# Patient Record
Sex: MALE | Race: WHITE | NOT HISPANIC OR LATINO | Employment: FULL TIME | URBAN - METROPOLITAN AREA
[De-identification: names, ages, dates, MRNs, and addresses within clinical notes are randomized per-mention and may not be internally consistent; named-entity substitution may affect disease eponyms.]

---

## 2024-01-10 ENCOUNTER — TRANSFERRED RECORDS (OUTPATIENT)
Dept: HEALTH INFORMATION MANAGEMENT | Facility: CLINIC | Age: 44
End: 2024-01-10

## 2024-01-30 ENCOUNTER — APPOINTMENT (OUTPATIENT)
Dept: GENERAL RADIOLOGY | Facility: CLINIC | Age: 44
End: 2024-01-30
Attending: EMERGENCY MEDICINE
Payer: COMMERCIAL

## 2024-01-30 ENCOUNTER — HOSPITAL ENCOUNTER (INPATIENT)
Facility: CLINIC | Age: 44
LOS: 2 days | Discharge: HOME | End: 2024-02-01
Attending: EMERGENCY MEDICINE | Admitting: STUDENT IN AN ORGANIZED HEALTH CARE EDUCATION/TRAINING PROGRAM
Payer: COMMERCIAL

## 2024-01-30 DIAGNOSIS — R79.89 PSEUDOHYPONATREMIA: ICD-10-CM

## 2024-01-30 DIAGNOSIS — E11.00 HYPEROSMOLAR HYPERGLYCEMIC STATE (HHS) (H): ICD-10-CM

## 2024-01-30 DIAGNOSIS — B37.0 THRUSH: ICD-10-CM

## 2024-01-30 DIAGNOSIS — R73.9 HYPERGLYCEMIA: Primary | ICD-10-CM

## 2024-01-30 LAB
ALBUMIN SERPL BCG-MCNC: 3.8 G/DL (ref 3.5–5.2)
ALBUMIN UR-MCNC: NEGATIVE MG/DL
ALP SERPL-CCNC: 119 U/L (ref 40–150)
ALT SERPL W P-5'-P-CCNC: 13 U/L (ref 0–70)
ANION GAP SERPL CALCULATED.3IONS-SCNC: 16 MMOL/L (ref 7–15)
APPEARANCE UR: CLEAR
AST SERPL W P-5'-P-CCNC: 10 U/L (ref 0–45)
ATRIAL RATE - MUSE: 95 BPM
B-OH-BUTYR SERPL-SCNC: 0.3 MMOL/L
B-OH-BUTYR SERPL-SCNC: 0.7 MMOL/L
B-OH-BUTYR SERPL-SCNC: 0.9 MMOL/L
BASOPHILS # BLD AUTO: 0.1 10E3/UL (ref 0–0.2)
BASOPHILS NFR BLD AUTO: 0 %
BILIRUB DIRECT SERPL-MCNC: <0.2 MG/DL (ref 0–0.3)
BILIRUB SERPL-MCNC: 0.5 MG/DL
BILIRUB UR QL STRIP: NEGATIVE
BUN SERPL-MCNC: 23 MG/DL (ref 6–20)
CALCIUM SERPL-MCNC: 9.3 MG/DL (ref 8.6–10)
CHLORIDE SERPL-SCNC: 76 MMOL/L (ref 98–107)
COLOR UR AUTO: ABNORMAL
CREAT SERPL-MCNC: 0.73 MG/DL (ref 0.67–1.17)
CREAT SERPL-MCNC: 0.93 MG/DL (ref 0.67–1.17)
D DIMER PPP FEU-MCNC: 0.44 UG/ML FEU (ref 0–0.5)
DEPRECATED HCO3 PLAS-SCNC: 24 MMOL/L (ref 22–29)
DIASTOLIC BLOOD PRESSURE - MUSE: NORMAL MMHG
EGFRCR SERPLBLD CKD-EPI 2021: >90 ML/MIN/1.73M2
EGFRCR SERPLBLD CKD-EPI 2021: >90 ML/MIN/1.73M2
EOSINOPHIL # BLD AUTO: 0.1 10E3/UL (ref 0–0.7)
EOSINOPHIL NFR BLD AUTO: 1 %
ERYTHROCYTE [DISTWIDTH] IN BLOOD BY AUTOMATED COUNT: 11.9 % (ref 10–15)
FLUAV RNA SPEC QL NAA+PROBE: NEGATIVE
FLUBV RNA RESP QL NAA+PROBE: NEGATIVE
GLUCOSE BLDC GLUCOMTR-MCNC: 273 MG/DL (ref 70–99)
GLUCOSE BLDC GLUCOMTR-MCNC: 318 MG/DL (ref 70–99)
GLUCOSE BLDC GLUCOMTR-MCNC: 382 MG/DL (ref 70–99)
GLUCOSE BLDC GLUCOMTR-MCNC: 425 MG/DL (ref 70–99)
GLUCOSE BLDC GLUCOMTR-MCNC: 464 MG/DL (ref 70–99)
GLUCOSE SERPL-MCNC: 618 MG/DL (ref 70–99)
GLUCOSE SERPL-MCNC: 949 MG/DL (ref 70–99)
GLUCOSE UR STRIP-MCNC: >=1000 MG/DL
GROUP A STREP BY PCR: NOT DETECTED
HBA1C MFR BLD: 16.5 %
HCO3 BLDV-SCNC: 29 MMOL/L (ref 21–28)
HCT VFR BLD AUTO: 40.2 % (ref 40–53)
HGB BLD-MCNC: 14 G/DL (ref 13.3–17.7)
HGB UR QL STRIP: NEGATIVE
IMM GRANULOCYTES # BLD: 0.1 10E3/UL
IMM GRANULOCYTES NFR BLD: 1 %
INTERPRETATION ECG - MUSE: NORMAL
KETONES UR STRIP-MCNC: ABNORMAL MG/DL
LACTATE BLD-SCNC: 1.7 MMOL/L
LEUKOCYTE ESTERASE UR QL STRIP: NEGATIVE
LYMPHOCYTES # BLD AUTO: 1.7 10E3/UL (ref 0.8–5.3)
LYMPHOCYTES NFR BLD AUTO: 13 %
MAGNESIUM SERPL-MCNC: 1.8 MG/DL (ref 1.7–2.3)
MCH RBC QN AUTO: 31 PG (ref 26.5–33)
MCHC RBC AUTO-ENTMCNC: 34.8 G/DL (ref 31.5–36.5)
MCV RBC AUTO: 89 FL (ref 78–100)
MONOCYTES # BLD AUTO: 0.8 10E3/UL (ref 0–1.3)
MONOCYTES NFR BLD AUTO: 6 %
NEUTROPHILS # BLD AUTO: 10.3 10E3/UL (ref 1.6–8.3)
NEUTROPHILS NFR BLD AUTO: 79 %
NITRATE UR QL: NEGATIVE
NRBC # BLD AUTO: 0 10E3/UL
NRBC BLD AUTO-RTO: 0 /100
OSMOLALITY SERPL: 298 MMOL/KG (ref 275–295)
P AXIS - MUSE: 22 DEGREES
PCO2 BLDV: 38 MM HG (ref 40–50)
PH BLDV: 7.48 [PH] (ref 7.32–7.43)
PH UR STRIP: 6.5 [PH] (ref 5–7)
PHOSPHATE SERPL-MCNC: 3.3 MG/DL (ref 2.5–4.5)
PLATELET # BLD AUTO: 241 10E3/UL (ref 150–450)
PO2 BLDV: 23 MM HG (ref 25–47)
POTASSIUM SERPL-SCNC: 4 MMOL/L (ref 3.4–5.3)
PR INTERVAL - MUSE: 156 MS
PROCALCITONIN SERPL IA-MCNC: 0.17 NG/ML
PROT SERPL-MCNC: 6.9 G/DL (ref 6.4–8.3)
QRS DURATION - MUSE: 142 MS
QT - MUSE: 394 MS
QTC - MUSE: 495 MS
R AXIS - MUSE: -28 DEGREES
RBC # BLD AUTO: 4.52 10E6/UL (ref 4.4–5.9)
RBC URINE: 0 /HPF
RSV RNA SPEC NAA+PROBE: NEGATIVE
SAO2 % BLDV: 45 % (ref 70–75)
SARS-COV-2 RNA RESP QL NAA+PROBE: POSITIVE
SODIUM SERPL-SCNC: 116 MMOL/L (ref 135–145)
SP GR UR STRIP: 1.02 (ref 1–1.03)
SYSTOLIC BLOOD PRESSURE - MUSE: NORMAL MMHG
T AXIS - MUSE: 4 DEGREES
TROPONIN T SERPL HS-MCNC: 13 NG/L
UROBILINOGEN UR STRIP-MCNC: NORMAL MG/DL
VENTRICULAR RATE- MUSE: 95 BPM
WBC # BLD AUTO: 13 10E3/UL (ref 4–11)
WBC URINE: 0 /HPF

## 2024-01-30 PROCEDURE — 82962 GLUCOSE BLOOD TEST: CPT

## 2024-01-30 PROCEDURE — 94640 AIRWAY INHALATION TREATMENT: CPT

## 2024-01-30 PROCEDURE — 93005 ELECTROCARDIOGRAM TRACING: CPT

## 2024-01-30 PROCEDURE — 258N000003 HC RX IP 258 OP 636: Performed by: EMERGENCY MEDICINE

## 2024-01-30 PROCEDURE — 82248 BILIRUBIN DIRECT: CPT | Performed by: STUDENT IN AN ORGANIZED HEALTH CARE EDUCATION/TRAINING PROGRAM

## 2024-01-30 PROCEDURE — 85041 AUTOMATED RBC COUNT: CPT | Performed by: EMERGENCY MEDICINE

## 2024-01-30 PROCEDURE — 84484 ASSAY OF TROPONIN QUANT: CPT | Performed by: EMERGENCY MEDICINE

## 2024-01-30 PROCEDURE — 250N000013 HC RX MED GY IP 250 OP 250 PS 637: Performed by: EMERGENCY MEDICINE

## 2024-01-30 PROCEDURE — 82010 KETONE BODYS QUAN: CPT | Performed by: STUDENT IN AN ORGANIZED HEALTH CARE EDUCATION/TRAINING PROGRAM

## 2024-01-30 PROCEDURE — 82010 KETONE BODYS QUAN: CPT | Performed by: EMERGENCY MEDICINE

## 2024-01-30 PROCEDURE — 83735 ASSAY OF MAGNESIUM: CPT | Performed by: STUDENT IN AN ORGANIZED HEALTH CARE EDUCATION/TRAINING PROGRAM

## 2024-01-30 PROCEDURE — 87651 STREP A DNA AMP PROBE: CPT | Performed by: EMERGENCY MEDICINE

## 2024-01-30 PROCEDURE — 85379 FIBRIN DEGRADATION QUANT: CPT | Performed by: STUDENT IN AN ORGANIZED HEALTH CARE EDUCATION/TRAINING PROGRAM

## 2024-01-30 PROCEDURE — 96360 HYDRATION IV INFUSION INIT: CPT

## 2024-01-30 PROCEDURE — 36415 COLL VENOUS BLD VENIPUNCTURE: CPT | Performed by: EMERGENCY MEDICINE

## 2024-01-30 PROCEDURE — 83930 ASSAY OF BLOOD OSMOLALITY: CPT | Performed by: EMERGENCY MEDICINE

## 2024-01-30 PROCEDURE — 36415 COLL VENOUS BLD VENIPUNCTURE: CPT | Performed by: STUDENT IN AN ORGANIZED HEALTH CARE EDUCATION/TRAINING PROGRAM

## 2024-01-30 PROCEDURE — 71046 X-RAY EXAM CHEST 2 VIEWS: CPT

## 2024-01-30 PROCEDURE — 258N000003 HC RX IP 258 OP 636: Performed by: STUDENT IN AN ORGANIZED HEALTH CARE EDUCATION/TRAINING PROGRAM

## 2024-01-30 PROCEDURE — 82803 BLOOD GASES ANY COMBINATION: CPT

## 2024-01-30 PROCEDURE — 84145 PROCALCITONIN (PCT): CPT | Performed by: STUDENT IN AN ORGANIZED HEALTH CARE EDUCATION/TRAINING PROGRAM

## 2024-01-30 PROCEDURE — 83036 HEMOGLOBIN GLYCOSYLATED A1C: CPT | Performed by: EMERGENCY MEDICINE

## 2024-01-30 PROCEDURE — 250N000009 HC RX 250: Performed by: EMERGENCY MEDICINE

## 2024-01-30 PROCEDURE — 99285 EMERGENCY DEPT VISIT HI MDM: CPT | Mod: 25

## 2024-01-30 PROCEDURE — 120N000013 HC R&B IMCU

## 2024-01-30 PROCEDURE — 250N000012 HC RX MED GY IP 250 OP 636 PS 637: Performed by: EMERGENCY MEDICINE

## 2024-01-30 PROCEDURE — 87637 SARSCOV2&INF A&B&RSV AMP PRB: CPT | Performed by: EMERGENCY MEDICINE

## 2024-01-30 PROCEDURE — 82947 ASSAY GLUCOSE BLOOD QUANT: CPT | Performed by: EMERGENCY MEDICINE

## 2024-01-30 PROCEDURE — 99223 1ST HOSP IP/OBS HIGH 75: CPT | Performed by: STUDENT IN AN ORGANIZED HEALTH CARE EDUCATION/TRAINING PROGRAM

## 2024-01-30 PROCEDURE — 82374 ASSAY BLOOD CARBON DIOXIDE: CPT | Performed by: EMERGENCY MEDICINE

## 2024-01-30 PROCEDURE — 81001 URINALYSIS AUTO W/SCOPE: CPT | Performed by: EMERGENCY MEDICINE

## 2024-01-30 PROCEDURE — 96361 HYDRATE IV INFUSION ADD-ON: CPT

## 2024-01-30 PROCEDURE — 84100 ASSAY OF PHOSPHORUS: CPT | Performed by: STUDENT IN AN ORGANIZED HEALTH CARE EDUCATION/TRAINING PROGRAM

## 2024-01-30 PROCEDURE — 250N000013 HC RX MED GY IP 250 OP 250 PS 637: Performed by: STUDENT IN AN ORGANIZED HEALTH CARE EDUCATION/TRAINING PROGRAM

## 2024-01-30 PROCEDURE — 82565 ASSAY OF CREATININE: CPT | Performed by: STUDENT IN AN ORGANIZED HEALTH CARE EDUCATION/TRAINING PROGRAM

## 2024-01-30 RX ORDER — ENOXAPARIN SODIUM 100 MG/ML
40 INJECTION SUBCUTANEOUS EVERY 24 HOURS
Status: DISCONTINUED | OUTPATIENT
Start: 2024-01-31 | End: 2024-02-01 | Stop reason: HOSPADM

## 2024-01-30 RX ORDER — PREDNISONE 20 MG/1
20 TABLET ORAL DAILY
COMMUNITY
End: 2024-01-30

## 2024-01-30 RX ORDER — ALBUTEROL SULFATE 90 UG/1
2 AEROSOL, METERED RESPIRATORY (INHALATION) EVERY 4 HOURS PRN
COMMUNITY
Start: 2023-05-10 | End: 2024-02-28

## 2024-01-30 RX ORDER — FEXOFENADINE HCL AND PSEUDOEPHEDRINE HCI 60; 120 MG/1; MG/1
1 TABLET, EXTENDED RELEASE ORAL 2 TIMES DAILY
COMMUNITY
End: 2024-02-07

## 2024-01-30 RX ORDER — DEXTROSE MONOHYDRATE 25 G/50ML
25-50 INJECTION, SOLUTION INTRAVENOUS
Status: DISCONTINUED | OUTPATIENT
Start: 2024-01-30 | End: 2024-01-30

## 2024-01-30 RX ORDER — NICOTINE 21 MG/24HR
1 PATCH, TRANSDERMAL 24 HOURS TRANSDERMAL DAILY
Status: DISCONTINUED | OUTPATIENT
Start: 2024-01-31 | End: 2024-01-30

## 2024-01-30 RX ORDER — NICOTINE POLACRILEX 4 MG
15-30 LOZENGE BUCCAL
Status: DISCONTINUED | OUTPATIENT
Start: 2024-01-30 | End: 2024-01-30

## 2024-01-30 RX ORDER — DEXTROSE MONOHYDRATE 25 G/50ML
25-50 INJECTION, SOLUTION INTRAVENOUS
Status: DISCONTINUED | OUTPATIENT
Start: 2024-01-30 | End: 2024-01-31

## 2024-01-30 RX ORDER — OMEPRAZOLE AND SODIUM BICARBONATE 40; 1100 MG/1; MG/1
1 CAPSULE ORAL
COMMUNITY
End: 2024-02-07

## 2024-01-30 RX ORDER — SODIUM CHLORIDE AND POTASSIUM CHLORIDE 150; 900 MG/100ML; MG/100ML
INJECTION, SOLUTION INTRAVENOUS CONTINUOUS
Status: DISCONTINUED | OUTPATIENT
Start: 2024-01-30 | End: 2024-01-31

## 2024-01-30 RX ORDER — GUAIFENESIN 600 MG/1
600 TABLET, EXTENDED RELEASE ORAL 2 TIMES DAILY
COMMUNITY

## 2024-01-30 RX ORDER — NICOTINE POLACRILEX 4 MG
15-30 LOZENGE BUCCAL
Status: DISCONTINUED | OUTPATIENT
Start: 2024-01-30 | End: 2024-01-31

## 2024-01-30 RX ORDER — DEXTROSE MONOHYDRATE, SODIUM CHLORIDE, AND POTASSIUM CHLORIDE 50; 1.49; 4.5 G/1000ML; G/1000ML; G/1000ML
INJECTION, SOLUTION INTRAVENOUS CONTINUOUS
Status: DISCONTINUED | OUTPATIENT
Start: 2024-01-30 | End: 2024-01-31

## 2024-01-30 RX ORDER — IPRATROPIUM BROMIDE AND ALBUTEROL SULFATE 2.5; .5 MG/3ML; MG/3ML
6 SOLUTION RESPIRATORY (INHALATION) ONCE
Status: COMPLETED | OUTPATIENT
Start: 2024-01-30 | End: 2024-01-30

## 2024-01-30 RX ORDER — NICOTINE 21 MG/24HR
1 PATCH, TRANSDERMAL 24 HOURS TRANSDERMAL DAILY
Status: DISCONTINUED | OUTPATIENT
Start: 2024-01-30 | End: 2024-02-01 | Stop reason: HOSPADM

## 2024-01-30 RX ORDER — NITROGLYCERIN 0.4 MG/1
0.4 TABLET SUBLINGUAL EVERY 5 MIN PRN
Status: DISCONTINUED | OUTPATIENT
Start: 2024-01-30 | End: 2024-02-01 | Stop reason: HOSPADM

## 2024-01-30 RX ORDER — SODIUM CHLORIDE 9 MG/ML
INJECTION, SOLUTION INTRAVENOUS CONTINUOUS
Status: DISCONTINUED | OUTPATIENT
Start: 2024-01-30 | End: 2024-01-30

## 2024-01-30 RX ORDER — LISINOPRIL 40 MG/1
40 TABLET ORAL DAILY
COMMUNITY
Start: 2023-05-10 | End: 2024-02-07

## 2024-01-30 RX ORDER — CHLORTHALIDONE 25 MG/1
25 TABLET ORAL DAILY
COMMUNITY
End: 2024-02-07

## 2024-01-30 RX ORDER — ENOXAPARIN SODIUM 100 MG/ML
40 INJECTION SUBCUTANEOUS EVERY 24 HOURS
Status: DISCONTINUED | OUTPATIENT
Start: 2024-01-30 | End: 2024-01-30

## 2024-01-30 RX ORDER — FLUTICASONE PROPIONATE AND SALMETEROL 250; 50 UG/1; UG/1
1 POWDER RESPIRATORY (INHALATION) 2 TIMES DAILY
COMMUNITY
Start: 2023-05-10 | End: 2024-02-07

## 2024-01-30 RX ORDER — NAPROXEN SODIUM 220 MG
440 TABLET ORAL DAILY
COMMUNITY
End: 2024-02-28 | Stop reason: ALTCHOICE

## 2024-01-30 RX ORDER — LIDOCAINE 40 MG/G
CREAM TOPICAL
Status: DISCONTINUED | OUTPATIENT
Start: 2024-01-30 | End: 2024-02-01 | Stop reason: HOSPADM

## 2024-01-30 RX ADMIN — INSULIN ASPART 10 UNITS: 100 INJECTION, SOLUTION INTRAVENOUS; SUBCUTANEOUS at 16:14

## 2024-01-30 RX ADMIN — NICOTINE 1 PATCH: 14 PATCH, EXTENDED RELEASE TRANSDERMAL at 23:24

## 2024-01-30 RX ADMIN — INSULIN HUMAN 5.5 UNITS/HR: 1 INJECTION, SOLUTION INTRAVENOUS at 18:04

## 2024-01-30 RX ADMIN — POTASSIUM CHLORIDE, DEXTROSE MONOHYDRATE AND SODIUM CHLORIDE: 150; 5; 450 INJECTION, SOLUTION INTRAVENOUS at 23:24

## 2024-01-30 RX ADMIN — IPRATROPIUM BROMIDE AND ALBUTEROL SULFATE 6 ML: .5; 3 SOLUTION RESPIRATORY (INHALATION) at 16:53

## 2024-01-30 RX ADMIN — SODIUM CHLORIDE 1000 ML: 9 INJECTION, SOLUTION INTRAVENOUS at 20:36

## 2024-01-30 RX ADMIN — SODIUM CHLORIDE 1000 ML: 9 INJECTION, SOLUTION INTRAVENOUS at 15:47

## 2024-01-30 RX ADMIN — NYSTATIN 600000 UNITS: 100000 SUSPENSION ORAL at 17:45

## 2024-01-30 ASSESSMENT — ACTIVITIES OF DAILY LIVING (ADL)
ADLS_ACUITY_SCORE: 35

## 2024-01-30 NOTE — PHARMACY-ADMISSION MEDICATION HISTORY
Pharmacist Admission Medication History    Admission medication history is complete. The information provided in this note is only as accurate as the sources available at the time of the update.    Information Source(s): Patient, Family member, Clinic records, Hospital records, and [facility] via in-person    Pertinent Information: Patient was recently prescribed prednisone burst, therapy completed.     Changes made to PTA medication list:  Added: added all meds to list   Deleted: None  Changed: None    Allergies reviewed with patient and updates made in EHR: yes    Medication History Completed By: Jess Layton RPH 1/30/2024 5:02 PM    PTA Med List   Medication Sig Last Dose    albuterol (PROAIR HFA/PROVENTIL HFA/VENTOLIN HFA) 108 (90 Base) MCG/ACT inhaler Inhale 2 puffs into the lungs every 4 hours as needed for shortness of breath or wheezing  at prn    chlorthalidone (HYGROTON) 25 MG tablet Take 25 mg by mouth daily 1/30/2024    fexofenadine-pseudoePHEDrine (ALLEGRA-D)  MG 12 hr tablet Take 1 tablet by mouth 2 times daily 1/30/2024    fluticasone-salmeterol (ADVAIR) 250-50 MCG/ACT inhaler Inhale 1 puff into the lungs 2 times daily 1/30/2024    guaiFENesin (MUCINEX) 600 MG 12 hr tablet Take 600 mg by mouth 2 times daily 1/30/2024    lisinopril (ZESTRIL) 40 MG tablet Take 40 mg by mouth daily 1/30/2024    naproxen sodium (ANAPROX) 220 MG tablet Take 440 mg by mouth daily 1/30/2024    omeprazole-sodium bicarbonate (ZEGERID)  MG capsule Take 1 capsule by mouth every morning (before breakfast) 1/30/2024

## 2024-01-30 NOTE — ED NOTES
Lab called reporting A1C very high and out of range, unable to get result. Lab ran twice with same (lack of) result.

## 2024-01-30 NOTE — ED TRIAGE NOTES
Patient was dx with COVID 3 weeks ago and has been having a cough with production and SOB every since.          PAST MEDICAL HISTORY:  Irregular menses

## 2024-01-30 NOTE — ED PROVIDER NOTES
"  History     Chief Complaint:  Cough and Shortness of Breath       HPI   Alexey Arriaga is a 43 year old male with a history of asthma, GERD, hypertension, and tobacco use who presents with a cough and shortness of breath.  Patient reports he tested positive for COVID 3 weeks ago, and has been extremely fatigued since.  As of the last 5 days, he also developed increased shortness of breath and a productive cough, which exacerbates his shortness of breath.  He adds that he also feels more short of breath when lying down on his right side or his back.  He does endorse a sore throat.  No nausea, vomiting, diarrhea, or leg swelling.  He has been using inhalers at home, which provide minimal relief.    Independent Historian:   None - Patient Only    Review of External Notes:   See MDM     Medications:    Albuterol  Cephalexin   Cetirizine   Chlorthalidone   Lansoprazole   Loratadine   Prednisone     Past Medical History:    Hypertension  Hypertriglyceridemia  Asthma  GERD  Migraines  Staph skin infection  Perirectal abscess  Bronchitis  Type 2 diabetes    Past Surgical History:    Colectomy  Perianal fistula X2  Dupree teeth  Left leg surgery    Physical Exam   Patient Vitals for the past 24 hrs:   BP Temp Temp src Pulse Resp SpO2 Height Weight   01/30/24 1700 (!) 166/110 -- -- 93 15 99 % -- --   01/30/24 1630 113/82 -- -- 93 -- 92 % -- --   01/30/24 1629 -- -- -- -- 23 -- -- --   01/30/24 1541 111/78 -- -- 96 14 98 % -- --   01/30/24 1329 109/71 98.3  F (36.8  C) Oral 99 22 96 % 1.88 m (6' 2\") 108.9 kg (240 lb)      Physical Exam  Constitutional: Well developed, forlorn, somewhat ill, nontox appearance  Head: Atraumatic.   Mouth/Throat: Oropharynx is clear and moist, thrush noted  Neck:  no stridor  Eyes: no scleral icterus  Cardiovascular: RRR, 2+ bilat radial pulses  Pulmonary/Chest: nml resp effort, Clear BS bilat  Abdominal: ND, soft, NT, no rebound or guarding   Ext: Warm, well perfused, no " edema  Neurological: A&O, symmetric facies, moves ext x4  Skin: Skin is warm and dry.   Psychiatric: Behavior is normal. Thought content normal.   Nursing note and vitals reviewed.    Emergency Department Course   ECG  ECG results from 01/30/24   EKG 12 lead     Value    Systolic Blood Pressure     Diastolic Blood Pressure     Ventricular Rate 95    Atrial Rate 95    HI Interval 156    QRS Duration 142        QTc 495    P Axis 22    R AXIS -28    T Axis 4    Interpretation ECG      Sinus rhythm  Possible Left atrial enlargement  Right bundle branch block  Read by: Dr. Alec De MD       Imaging:  Chest XR,  PA & LAT   Final Result   IMPRESSION: No focal consolidation, pleural effusion or pneumothorax.   Cardiomediastinal silhouette is unremarkable.      ARIN CARSON MD            SYSTEM ID:  [id number]         Report per radiology    Laboratory:  Labs Ordered and Resulted from Time of ED Arrival to Time of ED Departure   BASIC METABOLIC PANEL - Abnormal       Result Value    Sodium 116 (*)     Potassium 4.0      Chloride 76 (*)     Carbon Dioxide (CO2) 24      Anion Gap 16 (*)     Urea Nitrogen 23.0 (*)     Creatinine 0.93      GFR Estimate >90      Calcium 9.3      Glucose 949 (*)    CBC WITH PLATELETS AND DIFFERENTIAL - Abnormal    WBC Count 13.0 (*)     RBC Count 4.52      Hemoglobin 14.0      Hematocrit 40.2      MCV 89      MCH 31.0      MCHC 34.8      RDW 11.9      Platelet Count 241      % Neutrophils 79      % Lymphocytes 13      % Monocytes 6      % Eosinophils 1      % Basophils 0      % Immature Granulocytes 1      NRBCs per 100 WBC 0      Absolute Neutrophils 10.3 (*)     Absolute Lymphocytes 1.7      Absolute Monocytes 0.8      Absolute Eosinophils 0.1      Absolute Basophils 0.1      Absolute Immature Granulocytes 0.1      Absolute NRBCs 0.0     KETONE BETA-HYDROXYBUTYRATE QUANTITATIVE, RAPID - Abnormal    Ketone (Beta-Hydroxybutyrate) Quantitative 0.70 (*)    ROUTINE UA WITH  MICROSCOPIC REFLEX TO CULTURE - Abnormal    Color Urine Straw      Appearance Urine Clear      Glucose Urine >=1000 (*)     Bilirubin Urine Negative      Ketones Urine Trace (*)     Specific Gravity Urine 1.025      Blood Urine Negative      pH Urine 6.5      Protein Albumin Urine Negative      Urobilinogen Urine Normal      Nitrite Urine Negative      Leukocyte Esterase Urine Negative      RBC Urine 0      WBC Urine 0     OSMOLALITY - Abnormal    Osmolality Blood 298 (*)    INFLUENZA A/B, RSV, & SARS-COV2 PCR - Abnormal    Influenza A PCR Negative      Influenza B PCR Negative      RSV PCR Negative      SARS CoV2 PCR Positive (*)    HEMOGLOBIN A1C - Abnormal    Hemoglobin A1C 16.5 (*)    ISTAT GASES LACTATE VENOUS POCT - Abnormal    Lactic Acid POCT 1.7      Bicarbonate Venous POCT 29 (*)     O2 Sat, Venous POCT 45 (*)     pCO2 Venous POCT 38 (*)     pH Venous POCT 7.48 (*)     pO2 Venous POCT 23 (*)    GLUCOSE - Abnormal    Glucose 618 (*)    TROPONIN T, HIGH SENSITIVITY - Normal    Troponin T, High Sensitivity 13     GROUP A STREPTOCOCCUS PCR THROAT SWAB - Normal    Group A strep by PCR Not Detected     GLUCOSE MONITOR NURSING POCT      Emergency Department Course & Assessments:    Interventions:  Medications   sodium chloride (PF) 0.9% PF flush 3 mL ( Intracatheter Canceled Entry 1/30/24 1549)   sodium chloride (PF) 0.9% PF flush 3 mL (has no administration in time range)   glucose gel 15-30 g (has no administration in time range)     Or   dextrose 50 % injection 25-50 mL (has no administration in time range)     Or   glucagon injection 1 mg (has no administration in time range)   insulin regular (MYXREDLIN) 1 unit/mL infusion (5.5 Units/hr Intravenous $New Bag 1/30/24 1804)   sodium chloride 0.9% BOLUS 1,000 mL (0 mLs Intravenous Stopped 1/30/24 1755)   insulin aspart (NovoLOG) injection (RAPID ACTING) (10 Units Subcutaneous $Given 1/30/24 1614)   ipratropium - albuterol 0.5 mg/2.5 mg/3 mL (DUONEB) neb solution  6 mL (6 mLs Nebulization $Given 24 3707)   nystatin (MYCOSTATIN) [id number] unit/mL suspension 600,000 Units (600,000 Units Oral $Given 24 1745)      Independent Interpretation (X-rays, CTs, rhythm strip):  See MDM    Assessments/Consultations/Discussion of Management or Tests:  ED Course as of 24 1811   Tue 2024   1553 I evaluated the patient   1729 C/w Dr. Polo, hospitalist.      Social Determinants of Health affecting care:   See MDM    Disposition:  The patient was admitted to the hospital under the care of Dr. Polo.     Impression & Plan    Medical Decision Makin year old male presenting w/ cough     Social determinants affecting patient's health include: Continued tobacco use increasing risk for complications     I reviewed medical records from [facility] ED visit on 2024     DDx includes viral syndrome NOS, influenza-like illness, influenza, COVID-19.    Labs significant for hyperglycemia with minimal anion gap, no acidosis, COVID-19 positive although likely lingering from recent COVID diagnosis.  Imaging sig for no pneumothoraces on my independent interpretation with radiology read as noted above.  Patient appears to be in HHS without evidence of DKA.  His hyponatremia corrects to 130-136 is consistent with pseudohyponatremia given his hyperglycemia.  Hemoglobin A1c is significantly elevated.  I think would be appropriate to place the patient on insulin infusion to have him admitted for glucose correction as well labs diabetic teaching.  Physical exam is also consistent with thrush with first dose of nystatin given as noted above.  His recent steroid course likely exacerbated baseline and hyperglycemia. Pt counseled on all results, disposition and diagnosis.  They are understanding and agreeable to plan. Patient admitted in stable condition.      Critical care time: 30 minutes excluding procedures    Diagnosis:    ICD-10-CM    1. Pseudohyponatremia  R79.89       2.  Hyperosmolar hyperglycemic state (HHS) (H)  E11.00       3. Hyperglycemia  R73.9       4. Thrush  B37.0            Scribe Disclosure:  I, FER VILLARREAL, am serving as a scribe at 3:19 PM on 1/30/2024 to document services personally performed by Alec De MD based on my observations and the provider's statements to me.     1/30/2024   Alec De MD Vaughn, Christopher E, MD  01/30/24 1814

## 2024-01-30 NOTE — ED NOTES
"PIT/Triage Evaluation    Patient presented with cough and shortness of breath. Dennis reports a mildly productive cough, shortness of breath, and lightheadness for the last three weeks, noting that he was diagnosed with COVID three weeks ago. He also notes pharyngitis. He states that his symptoms have been worsening.    Exam is notable for:    Patient Vitals for the past 24 hrs:   BP Temp Temp src Pulse Resp SpO2 Height Weight   01/30/24 1329 109/71 98.3  F (36.8  C) Oral 99 22 96 % 1.88 m (6' 2\") 108.9 kg (240 lb)     General:  Alert, interactive  Cardiovascular:  Well perfused  Lungs:  No respiratory distress, no accessory muscle use  Neuro:  Moving all 4 extremities  Skin:  Warm, dry  Psych:  Normal affect    Appropriate interventions for symptom management were initiated if applicable.  Appropriate diagnostic tests were initiated if indicated.    Important information for subsequent clinician:  Middle-aged gentleman with a history of reactive airways disease and smoking presenting with ongoing cough, 3 weeks after being diagnosed with COVID.  Also describes some lightheadedness and dizziness.  Considering second visit for this issue, labs are obtained along with an EKG and a chest x-ray.    I briefly evaluated the patient and developed an initial plan of care. I discussed this plan and explained that this brief interaction does not constitute a full evaluation. Patient/family understands that they should wait to be fully evaluated and discuss any test results with another clinician prior to leaving the hospital.    Scribe Disclosure:  I, Hejeny Paras, am serving as a scribe at 1:35 PM on 1/30/2024 to document services personally performed by Trierweiler, Chad A, MD based on my observations and the provider's statements to me.     Trierweiler, Chad A, MD  01/30/24 1341    "

## 2024-01-31 ENCOUNTER — APPOINTMENT (OUTPATIENT)
Dept: CARDIOLOGY | Facility: CLINIC | Age: 44
End: 2024-01-31
Attending: STUDENT IN AN ORGANIZED HEALTH CARE EDUCATION/TRAINING PROGRAM
Payer: COMMERCIAL

## 2024-01-31 LAB
ANION GAP SERPL CALCULATED.3IONS-SCNC: 10 MMOL/L (ref 7–15)
ANION GAP SERPL CALCULATED.3IONS-SCNC: 12 MMOL/L (ref 7–15)
B-OH-BUTYR SERPL-SCNC: 0.4 MMOL/L
B-OH-BUTYR SERPL-SCNC: 0.9 MMOL/L
B-OH-BUTYR SERPL-SCNC: <0.18 MMOL/L
B-OH-BUTYR SERPL-SCNC: <0.18 MMOL/L
BUN SERPL-MCNC: 12.2 MG/DL (ref 6–20)
BUN SERPL-MCNC: 13.4 MG/DL (ref 6–20)
BUN SERPL-MCNC: 13.7 MG/DL (ref 6–20)
BUN SERPL-MCNC: 17.1 MG/DL (ref 6–20)
CALCIUM SERPL-MCNC: 9.5 MG/DL (ref 8.6–10)
CALCIUM SERPL-MCNC: 9.5 MG/DL (ref 8.6–10)
CALCIUM SERPL-MCNC: 9.7 MG/DL (ref 8.6–10)
CALCIUM SERPL-MCNC: 9.8 MG/DL (ref 8.6–10)
CHLORIDE SERPL-SCNC: 91 MMOL/L (ref 98–107)
CHLORIDE SERPL-SCNC: 93 MMOL/L (ref 98–107)
CHLORIDE SERPL-SCNC: 94 MMOL/L (ref 98–107)
CHLORIDE SERPL-SCNC: 94 MMOL/L (ref 98–107)
CREAT SERPL-MCNC: 0.69 MG/DL (ref 0.67–1.17)
CREAT SERPL-MCNC: 0.73 MG/DL (ref 0.67–1.17)
CREAT SERPL-MCNC: 0.79 MG/DL (ref 0.67–1.17)
CREAT SERPL-MCNC: 0.79 MG/DL (ref 0.67–1.17)
DEPRECATED HCO3 PLAS-SCNC: 25 MMOL/L (ref 22–29)
DEPRECATED HCO3 PLAS-SCNC: 26 MMOL/L (ref 22–29)
DEPRECATED HCO3 PLAS-SCNC: 28 MMOL/L (ref 22–29)
DEPRECATED HCO3 PLAS-SCNC: 32 MMOL/L (ref 22–29)
EGFRCR SERPLBLD CKD-EPI 2021: >90 ML/MIN/1.73M2
GLUCOSE BLDC GLUCOMTR-MCNC: 147 MG/DL (ref 70–99)
GLUCOSE BLDC GLUCOMTR-MCNC: 166 MG/DL (ref 70–99)
GLUCOSE BLDC GLUCOMTR-MCNC: 178 MG/DL (ref 70–99)
GLUCOSE BLDC GLUCOMTR-MCNC: 191 MG/DL (ref 70–99)
GLUCOSE BLDC GLUCOMTR-MCNC: 230 MG/DL (ref 70–99)
GLUCOSE BLDC GLUCOMTR-MCNC: 233 MG/DL (ref 70–99)
GLUCOSE BLDC GLUCOMTR-MCNC: 254 MG/DL (ref 70–99)
GLUCOSE BLDC GLUCOMTR-MCNC: 289 MG/DL (ref 70–99)
GLUCOSE BLDC GLUCOMTR-MCNC: 296 MG/DL (ref 70–99)
GLUCOSE BLDC GLUCOMTR-MCNC: 307 MG/DL (ref 70–99)
GLUCOSE BLDC GLUCOMTR-MCNC: 327 MG/DL (ref 70–99)
GLUCOSE BLDC GLUCOMTR-MCNC: 330 MG/DL (ref 70–99)
GLUCOSE BLDC GLUCOMTR-MCNC: 354 MG/DL (ref 70–99)
GLUCOSE BLDC GLUCOMTR-MCNC: 364 MG/DL (ref 70–99)
GLUCOSE BLDC GLUCOMTR-MCNC: 365 MG/DL (ref 70–99)
GLUCOSE SERPL-MCNC: 158 MG/DL (ref 70–99)
GLUCOSE SERPL-MCNC: 160 MG/DL (ref 70–99)
GLUCOSE SERPL-MCNC: 183 MG/DL (ref 70–99)
GLUCOSE SERPL-MCNC: 339 MG/DL (ref 70–99)
LVEF ECHO: NORMAL
MAGNESIUM SERPL-MCNC: 1.8 MG/DL (ref 1.7–2.3)
NT-PROBNP SERPL-MCNC: <36 PG/ML (ref 0–450)
PHOSPHATE SERPL-MCNC: 2 MG/DL (ref 2.5–4.5)
POTASSIUM SERPL-SCNC: 3.3 MMOL/L (ref 3.4–5.3)
POTASSIUM SERPL-SCNC: 3.3 MMOL/L (ref 3.4–5.3)
POTASSIUM SERPL-SCNC: 3.7 MMOL/L (ref 3.4–5.3)
SODIUM SERPL-SCNC: 130 MMOL/L (ref 135–145)
SODIUM SERPL-SCNC: 131 MMOL/L (ref 135–145)
SODIUM SERPL-SCNC: 131 MMOL/L (ref 135–145)
SODIUM SERPL-SCNC: 133 MMOL/L (ref 135–145)

## 2024-01-31 PROCEDURE — 82010 KETONE BODYS QUAN: CPT | Performed by: STUDENT IN AN ORGANIZED HEALTH CARE EDUCATION/TRAINING PROGRAM

## 2024-01-31 PROCEDURE — 80048 BASIC METABOLIC PNL TOTAL CA: CPT | Performed by: STUDENT IN AN ORGANIZED HEALTH CARE EDUCATION/TRAINING PROGRAM

## 2024-01-31 PROCEDURE — 36415 COLL VENOUS BLD VENIPUNCTURE: CPT | Performed by: STUDENT IN AN ORGANIZED HEALTH CARE EDUCATION/TRAINING PROGRAM

## 2024-01-31 PROCEDURE — 120N000013 HC R&B IMCU

## 2024-01-31 PROCEDURE — 93005 ELECTROCARDIOGRAM TRACING: CPT

## 2024-01-31 PROCEDURE — 250N000013 HC RX MED GY IP 250 OP 250 PS 637: Performed by: HOSPITALIST

## 2024-01-31 PROCEDURE — 250N000012 HC RX MED GY IP 250 OP 636 PS 637: Performed by: STUDENT IN AN ORGANIZED HEALTH CARE EDUCATION/TRAINING PROGRAM

## 2024-01-31 PROCEDURE — 255N000002 HC RX 255 OP 636: Performed by: STUDENT IN AN ORGANIZED HEALTH CARE EDUCATION/TRAINING PROGRAM

## 2024-01-31 PROCEDURE — 84100 ASSAY OF PHOSPHORUS: CPT | Performed by: STUDENT IN AN ORGANIZED HEALTH CARE EDUCATION/TRAINING PROGRAM

## 2024-01-31 PROCEDURE — C8929 TTE W OR WO FOL WCON,DOPPLER: HCPCS

## 2024-01-31 PROCEDURE — 93010 ELECTROCARDIOGRAM REPORT: CPT | Performed by: INTERNAL MEDICINE

## 2024-01-31 PROCEDURE — 93306 TTE W/DOPPLER COMPLETE: CPT | Mod: 26 | Performed by: INTERNAL MEDICINE

## 2024-01-31 PROCEDURE — 83735 ASSAY OF MAGNESIUM: CPT | Performed by: STUDENT IN AN ORGANIZED HEALTH CARE EDUCATION/TRAINING PROGRAM

## 2024-01-31 PROCEDURE — 99233 SBSQ HOSP IP/OBS HIGH 50: CPT | Performed by: HOSPITALIST

## 2024-01-31 PROCEDURE — 999N000208 ECHOCARDIOGRAM COMPLETE

## 2024-01-31 PROCEDURE — 83880 ASSAY OF NATRIURETIC PEPTIDE: CPT | Performed by: STUDENT IN AN ORGANIZED HEALTH CARE EDUCATION/TRAINING PROGRAM

## 2024-01-31 PROCEDURE — 250N000013 HC RX MED GY IP 250 OP 250 PS 637: Performed by: STUDENT IN AN ORGANIZED HEALTH CARE EDUCATION/TRAINING PROGRAM

## 2024-01-31 PROCEDURE — 250N000011 HC RX IP 250 OP 636: Performed by: STUDENT IN AN ORGANIZED HEALTH CARE EDUCATION/TRAINING PROGRAM

## 2024-01-31 RX ORDER — LANCETS
EACH MISCELLANEOUS
Qty: 200 EACH | Refills: 6 | Status: SHIPPED | OUTPATIENT
Start: 2024-01-31

## 2024-01-31 RX ORDER — FEXOFENADINE HCL AND PSEUDOEPHEDRINE HCI 60; 120 MG/1; MG/1
1 TABLET, EXTENDED RELEASE ORAL 2 TIMES DAILY
Status: DISCONTINUED | OUTPATIENT
Start: 2024-01-31 | End: 2024-02-01 | Stop reason: HOSPADM

## 2024-01-31 RX ORDER — GUAIFENESIN 200 MG/10ML
200 LIQUID ORAL EVERY 4 HOURS PRN
Status: DISCONTINUED | OUTPATIENT
Start: 2024-01-31 | End: 2024-02-01 | Stop reason: HOSPADM

## 2024-01-31 RX ORDER — SODIUM BICARBONATE 650 MG/1
1300 TABLET ORAL DAILY
Status: DISCONTINUED | OUTPATIENT
Start: 2024-02-01 | End: 2024-02-01 | Stop reason: HOSPADM

## 2024-01-31 RX ORDER — OMEPRAZOLE AND SODIUM BICARBONATE 40; 1100 MG/1; MG/1
1 CAPSULE ORAL
Status: DISCONTINUED | OUTPATIENT
Start: 2024-02-01 | End: 2024-01-31

## 2024-01-31 RX ORDER — POTASSIUM CHLORIDE 1500 MG/1
40 TABLET, EXTENDED RELEASE ORAL ONCE
Status: COMPLETED | OUTPATIENT
Start: 2024-01-31 | End: 2024-01-31

## 2024-01-31 RX ORDER — FLUTICASONE FUROATE AND VILANTEROL 100; 25 UG/1; UG/1
1 POWDER RESPIRATORY (INHALATION) DAILY
Status: DISCONTINUED | OUTPATIENT
Start: 2024-01-31 | End: 2024-02-01 | Stop reason: HOSPADM

## 2024-01-31 RX ORDER — GUAIFENESIN 600 MG/1
600 TABLET, EXTENDED RELEASE ORAL 2 TIMES DAILY
Status: DISCONTINUED | OUTPATIENT
Start: 2024-01-31 | End: 2024-02-01 | Stop reason: HOSPADM

## 2024-01-31 RX ORDER — ALBUTEROL SULFATE 90 UG/1
2 AEROSOL, METERED RESPIRATORY (INHALATION) EVERY 4 HOURS PRN
Status: DISCONTINUED | OUTPATIENT
Start: 2024-01-31 | End: 2024-02-01 | Stop reason: HOSPADM

## 2024-01-31 RX ORDER — LISINOPRIL 40 MG/1
40 TABLET ORAL DAILY
Status: DISCONTINUED | OUTPATIENT
Start: 2024-02-01 | End: 2024-02-01 | Stop reason: HOSPADM

## 2024-01-31 RX ORDER — CHLORTHALIDONE 25 MG/1
25 TABLET ORAL DAILY
Status: DISCONTINUED | OUTPATIENT
Start: 2024-02-01 | End: 2024-02-01 | Stop reason: HOSPADM

## 2024-01-31 RX ORDER — NICOTINE POLACRILEX 4 MG
15-30 LOZENGE BUCCAL
Status: DISCONTINUED | OUTPATIENT
Start: 2024-01-31 | End: 2024-02-01 | Stop reason: HOSPADM

## 2024-01-31 RX ORDER — DEXTROSE MONOHYDRATE 25 G/50ML
25-50 INJECTION, SOLUTION INTRAVENOUS
Status: DISCONTINUED | OUTPATIENT
Start: 2024-01-31 | End: 2024-02-01 | Stop reason: HOSPADM

## 2024-01-31 RX ORDER — PANTOPRAZOLE SODIUM 40 MG/1
40 TABLET, DELAYED RELEASE ORAL
Status: DISCONTINUED | OUTPATIENT
Start: 2024-01-31 | End: 2024-02-01 | Stop reason: HOSPADM

## 2024-01-31 RX ORDER — ALBUTEROL SULFATE 90 UG/1
2 AEROSOL, METERED RESPIRATORY (INHALATION) EVERY 4 HOURS PRN
Status: DISCONTINUED | OUTPATIENT
Start: 2024-01-31 | End: 2024-01-31

## 2024-01-31 RX ADMIN — POTASSIUM & SODIUM PHOSPHATES POWDER PACK 280-160-250 MG 1 PACKET: 280-160-250 PACK at 20:41

## 2024-01-31 RX ADMIN — FLUTICASONE FUROATE AND VILANTEROL TRIFENATATE 1 PUFF: 100; 25 POWDER RESPIRATORY (INHALATION) at 20:38

## 2024-01-31 RX ADMIN — Medication 1 LOZENGE: at 10:10

## 2024-01-31 RX ADMIN — GUAIFENESIN 600 MG: 600 TABLET, EXTENDED RELEASE ORAL at 10:10

## 2024-01-31 RX ADMIN — INSULIN GLARGINE 33 UNITS: 100 INJECTION, SOLUTION SUBCUTANEOUS at 09:14

## 2024-01-31 RX ADMIN — POTASSIUM & SODIUM PHOSPHATES POWDER PACK 280-160-250 MG 1 PACKET: 280-160-250 PACK at 13:04

## 2024-01-31 RX ADMIN — GUAIFENESIN 200 MG: 200 SOLUTION ORAL at 07:54

## 2024-01-31 RX ADMIN — POTASSIUM CHLORIDE 40 MEQ: 1500 TABLET, EXTENDED RELEASE ORAL at 04:27

## 2024-01-31 RX ADMIN — GUAIFENESIN 600 MG: 600 TABLET, EXTENDED RELEASE ORAL at 20:42

## 2024-01-31 RX ADMIN — NICOTINE 1 PATCH: 14 PATCH, EXTENDED RELEASE TRANSDERMAL at 22:02

## 2024-01-31 RX ADMIN — Medication 1 LOZENGE: at 21:00

## 2024-01-31 RX ADMIN — ALBUTEROL SULFATE 2 PUFF: 90 AEROSOL, METERED RESPIRATORY (INHALATION) at 21:58

## 2024-01-31 RX ADMIN — PANTOPRAZOLE SODIUM 40 MG: 40 TABLET, DELAYED RELEASE ORAL at 18:24

## 2024-01-31 RX ADMIN — FEXOFENADINE HYDROCHLORIDE AND PSEUDOEPHEDRINE HYDROCHLORIDE 1 TABLET: 60; 120 TABLET, FILM COATED, EXTENDED RELEASE ORAL at 20:41

## 2024-01-31 RX ADMIN — ENOXAPARIN SODIUM 40 MG: 40 INJECTION SUBCUTANEOUS at 08:07

## 2024-01-31 RX ADMIN — HUMAN ALBUMIN MICROSPHERES AND PERFLUTREN 9 ML: 10; .22 INJECTION, SOLUTION INTRAVENOUS at 11:25

## 2024-01-31 RX ADMIN — POTASSIUM & SODIUM PHOSPHATES POWDER PACK 280-160-250 MG 1 PACKET: 280-160-250 PACK at 18:24

## 2024-01-31 ASSESSMENT — ACTIVITIES OF DAILY LIVING (ADL)
ADLS_ACUITY_SCORE: 35
ADLS_ACUITY_SCORE: 18
ADLS_ACUITY_SCORE: 18
ADLS_ACUITY_SCORE: 35
ADLS_ACUITY_SCORE: 35
ADLS_ACUITY_SCORE: 18
ADLS_ACUITY_SCORE: 18
ADLS_ACUITY_SCORE: 35
ADLS_ACUITY_SCORE: 18
ADLS_ACUITY_SCORE: 35
ADLS_ACUITY_SCORE: 18
ADLS_ACUITY_SCORE: 18

## 2024-01-31 NOTE — H&P
[facility]    History and Physical - Hospitalist Service       Date of Admission:  1/30/2024    Assessment & Plan      Alexey Arriaga is a 43 year old male admitted on 1/30/2024. He is treated for reactive airway disease, type 2 diabetes mellitus not on any medication, COVID-19 infection, hypertension  Coming to the ER with increased shortness of breath and productive cough.  Was diagnosed with COVID-19 infection 3 weeks ago and was initially treated with steroids.  He was not admitted to the hospital and treated as outpatient.  Symptoms did not improve and he was continued on steroid taper.  He continues to have cough and shortness of breath with mucopurulent sputum.    # Diabetes mellitus type 2 with hyperglycemia  -# Hyperglycemia with mild DKA with elevated anion gap  Patient previously was on metformin in September 2020  -DKA protocol  -Admit to [facility]  -Monitor potassium phosphorus and magnesium  -POC every 1 hour  -Can transition to subcutaneous insulins after his anion gap closes and his ketones are negative  -Diet will need diabetic education and teaching              # COVID-19 infection  # Reactive airway disease  -Patient not vaccinated does not have previous COVID-19  -Was recently treated on steroids with minimal improvement of symptoms and then he was transitioned to a steroid taper.  Patient continues to have cough with mucopurulent sputum  Chest x-ray does not show any pneumonia  Patient is a  and will also do a D-dimer and if the D-dimer is positive will do a CTA to rule out a pulmonary embolism  -Patient also feels shortness of breath and wants a lot of echocardiogram  -Continue PTA inhalers  -Procal negative       -Hypertension   Start  PTA chlorthalidone and lisinopril after       # Tobacco abuse  Nicotine replacement protocol      # Pseudo hyponatremia  -Patient admission sodium was 116 corrected sodium is 130-136  This is pseudohypoglycemia due to  "high sugars  -CTM        Diet:    DVT Prophylaxis: Lovenox  Zazueta Catheter: Not present  Lines: None     Cardiac Monitoring: None  Code Status:  Full code    Clinically Significant Risk Factors Present on Admission         # Hyponatremia: Lowest Na = 116 mmol/L in last 2 days, will monitor as appropriate          # Hypertension: Home medication list includes antihypertensive(s)     # DMII: A1C = 16.5 % (Ref range: <5.7 %) within past 6 months    # Obesity: Estimated body mass index is 30.81 kg/m  as calculated from the following:    Height as of this encounter: 1.88 m (6' 2\").    Weight as of this encounter: 108.9 kg (240 lb).              Disposition Plan      Expected Discharge Date: 02/01/2024                  Zakia Polo MD  Hospitalist Service  [facility]  Securely message with [employer] (more info)  Text page via [facility] Paging/Directory     ______________________________________________________________________    Chief Complaint       History is obtained from the patient    History of Present Illness   Alexey Arriaga is a 43 year old male who   Alexey Arriaga is a 43 year old male admitted on 1/30/2024. He is treated for reactive airway disease, type 2 diabetes mellitus not on any medication, COVID-19 infection, hypertension  Coming to the ER with increased shortness of breath and productive cough.  Was diagnosed with COVID-19 infection 3 weeks ago and was initially treated with steroids.  He was not admitted to the hospital and treated as outpatient.  Symptoms did not improve and he was continued on steroid taper.  He continues to have cough and shortness of breath with mucopurulent sputum.    Denies any fever.  Continues to have shortness of breath.  Denies any chest pain.  Denies any lightheadedness.  Denies any bleeding in the stools.  Denies any abdominal pain.    Past Medical History    No past medical history on file.    Past Surgical History   No " past surgical history on file.    Prior to Admission Medications   Prior to Admission Medications   Prescriptions Last Dose Informant Patient Reported? Taking?   albuterol (PROAIR HFA/PROVENTIL HFA/VENTOLIN HFA) 108 (90 Base) MCG/ACT inhaler  at prn  Yes Yes   Sig: Inhale 2 puffs into the lungs every 4 hours as needed for shortness of breath or wheezing   chlorthalidone (HYGROTON) 25 MG tablet 1/30/2024  Yes Yes   Sig: Take 25 mg by mouth daily   fexofenadine-pseudoePHEDrine (ALLEGRA-D)  MG 12 hr tablet 1/30/2024  Yes Yes   Sig: Take 1 tablet by mouth 2 times daily   fluticasone-salmeterol (ADVAIR) 250-50 MCG/ACT inhaler 1/30/2024  Yes Yes   Sig: Inhale 1 puff into the lungs 2 times daily   guaiFENesin (MUCINEX) 600 MG 12 hr tablet 1/30/2024  Yes Yes   Sig: Take 600 mg by mouth 2 times daily   lisinopril (ZESTRIL) 40 MG tablet 1/30/2024  Yes Yes   Sig: Take 40 mg by mouth daily   naproxen sodium (ANAPROX) 220 MG tablet 1/30/2024  Yes Yes   Sig: Take 440 mg by mouth daily   omeprazole-sodium bicarbonate (ZEGERID)  MG capsule 1/30/2024  Yes Yes   Sig: Take 1 capsule by mouth every morning (before breakfast)      Facility-Administered Medications: None           Physical Exam   Vital Signs: Temp: 98.3  F (36.8  C) Temp src: Oral BP: 121/73 Pulse: 101   Resp: 12 SpO2: 95 % O2 Device: None (Room air)    Weight: 240 lbs 0 oz    Physical Exam  Cardiovascular:      Rate and Rhythm: Normal rate and regular rhythm.      Heart sounds: Normal heart sounds.   Pulmonary:      Effort: No respiratory distress.      Breath sounds: Normal breath sounds. No wheezing.   Abdominal:      General: There is no distension.      Palpations: Abdomen is soft.      Tenderness: There is no abdominal tenderness.   Musculoskeletal:      Right lower leg: No edema.      Left lower leg: No edema.          Medical Decision Making       75 MINUTES SPENT BY ME on the date of service doing chart review, history, exam, documentation & further  activities per the note.      Data     I have personally reviewed the following data over the past 24 hrs:    13.0 (H)  \   14.0   / 241     116 (LL) 76 (L) 23.0 (H) /  464 (H)   4.0 24 0.93 \     Trop: 13 BNP: N/A     TSH: N/A T4: N/A A1C: 16.5 (H)     Procal: N/A CRP: N/A Lactic Acid: 1.7         Imaging results reviewed over the past 24 hrs:   Recent Results (from the past 24 hour(s))   Chest XR,  PA & LAT    Narrative    CHEST TWO VIEWS 1/30/2024 2:15 PM     HISTORY: Cough, shortness of breath.    COMPARISON: None available.       Impression    IMPRESSION: No focal consolidation, pleural effusion or pneumothorax.  Cardiomediastinal silhouette is unremarkable.    ARIN CARSON MD         SYSTEM ID:  [id number]

## 2024-01-31 NOTE — CONSULTS
Patient Name: Alexey Arriaga   MRN: [id number]   Admit Date: 1/30/2024    Current Infection Status: COVID-19   Current Isolation Status: Special Precautions     Review of COVID-19 status and required isolation precautions    Refer to Special Precautions Duration and Period of Transmissibility Guideline, in Policy Tech.        Involved parties: Carl Howard, Infection Prevention and Other RN .    Criteria used to make decision: Symptoms Dates: 1/31/2024 - Productive Cough .    The involved parties have reviewed this patient's chart per the Special Precautions Duration and Period of Transmissibility guideline and have taken the following action:     DECISION - OPTION 2: The patient does not qualify for Special Precautions isolation discontinuation due to: Immunocompetent Symptomatic: Mild or Moderate: 10 days since symptoms began AND greater than or equal to 24hrs since last fever (without fever-reducing meds) AND COVID-19 symptoms have substantially improved. . The patient's infection and isolation status can be reviewed by date of: 2/5/2024 .     Wait until patients productive cough improves (or other cause of cough is determined). Feel free to reach out to IP before 2/5/2024 for removal of precautions.      Carl Howard, Infection Prevention     2

## 2024-01-31 NOTE — PROGRESS NOTES
[facility]    Medicine Progress Note - Hospitalist Service    Date of Admission:  1/30/2024    Assessment & Plan   # Diabetes mellitus type 2 with hyperglycemia  -# Hyperglycemia with mild DKA with elevated anion gap  Patient previously was on metformin in September 2020  New diagnosis per patient  -wean off the insulin infusion, already given 33 units this morning  - high dose sliding scale  - RN to provide training for insulin  -Diet will need diabetic education and teaching  - needs PCP followup, social work consult pending     # COVID-19 infection  # Reactive airway disease  Patient not vaccinated does not have previous COVID-19  Was recently treated on steroids with minimal improvement of symptoms and then he was transitioned to a steroid taper.  Patient continues to have cough with mucopurulent sputum  Chest x-ray does not show any pneumonia  D- dimer negative  - echocardiogram negative  - Continue PTA inhalers  - Procal negative   - advised time and smoking cessation        -Hypertension   restart PTA chlorthalidone and lisinopriil        # Tobacco abuse  Nicotine replacement protocol        # Pseudo hyponatremia  -Patient admission sodium was 116 corrected sodium is 130-136  This is pseudohypoglycemia due to high sugars  -CTM             Diet: Moderate Consistent Carb (60 g CHO per Meal) Diet    DVT Prophylaxis: Pneumatic Compression Devices  Zazueta Catheter: Not present  Lines: None     Cardiac Monitoring: ACTIVE order. Indication: Acute decompensated heart failure (48 hours)  Code Status: Full Code      Clinically Significant Risk Factors Present on Admission        # Hypokalemia: Lowest K = 3.3 mmol/L in last 2 days, will replace as needed  # Hyponatremia: Lowest Na = 116 mmol/L in last 2 days, will monitor as appropriate          # Hypertension: Home medication list includes antihypertensive(s)     # DMII: A1C = 16.5 % (Ref range: <5.7 %) within past 6 months    # Obesity:  "Estimated body mass index is 30.81 kg/m  as calculated from the following:    Height as of this encounter: 1.88 m (6' 2\").    Weight as of this encounter: 108.9 kg (240 lb).              Disposition Plan      Expected Discharge Date: 02/01/2024                    Farhan Mcpherson DO  Hospitalist Service  [facility]  Securely message with [employer] (more info)  Text page via [employer] Paging/Directory   ______________________________________________________________________    Interval History   Feels better today  On room air  No acute concerns, but is concerned about new dx of diabetes  Wife is rn who feels comfortable helping with insulin at home    Physical Exam   Vital Signs: Temp: 97.7  F (36.5  C) Temp src: Oral BP: (!) 139/99 Pulse: 93   Resp: 12 SpO2: 94 % O2 Device: None (Room air)    Weight: 240 lbs 0 oz    Physical Exam  Cardiovascular:      Rate and Rhythm: Normal rate and regular rhythm.      Heart sounds: Normal heart sounds.   Pulmonary:      Effort: No respiratory distress.      Breath sounds: Normal breath sounds. No wheezing.   Abdominal:      General: There is no distension.      Palpations: Abdomen is soft.      Tenderness: There is no abdominal tenderness.   Musculoskeletal:      Right lower leg: No edema.      Left lower leg: No edema.        Medical Decision Making       60 MINUTES SPENT BY ME on the date of service doing chart review, history, exam, documentation & further activities per the note.      Data     I have personally reviewed the following data over the past 24 hrs:    N/A  \   N/A   / N/A     130 (L) 94 (L) 17.1 /  339 (H)   3.7 26 0.69 \     ALT: 13 AST: 10 AP: 119 TBILI: 0.5   ALB: 3.8 TOT PROTEIN: 6.9 LIPASE: N/A     Trop: N/A BNP: <36     Procal: N/A CRP: N/A Lactic Acid: N/A       INR:  N/A PTT:  N/A   D-dimer:  0.44 Fibrinogen:  N/A       Imaging results reviewed over the past 24 hrs:   Recent Results (from the past 24 hour(s))   Echocardiogram " Complete   Result Value    LVEF  60-65%    [facility]    [id number]  [id number]  [id number]  [id number]^HINA^ALIN^NICK     [facility]  Echocardiography Laboratory  [address] [place] [address]     Name: MONY ARCHIBALD  MRN: [id number]  : 1980  Study Date: 2024 11:06 AM  Age: 43 yrs  Gender: Male  Patient Location: [facility]  Reason For Study: Dyspnea  Ordering Physician: ALIN SANTAMARIA  Referring Physician: ALIN SANTAMARIA  Performed By: Susy Shah     BSA: 2.3 m2  Height: 74 in  Weight: 240 lb  HR: 99  BP: 121/73 mmHg  ______________________________________________________________________________  Procedure  Complete Portable Echo Adult. Optison (NDC #[id number]) given intravenously.  ______________________________________________________________________________  Interpretation Summary     Left ventricular systolic function is normal.  The visual ejection fraction is 60-65%.  No regional wall motion abnormalities noted.  The study was technically adequate. There is no comparison study available.  ______________________________________________________________________________  Left Ventricle  The left ventricle is normal in size. There is normal left ventricular wall  thickness. Left ventricular systolic function is normal. The visual ejection  fraction is 60-65%. Left ventricular diastolic function is normal. No regional  wall motion abnormalities noted.     Right Ventricle  The right ventricle is normal size. The right ventricular systolic function is  normal.     Atria  Normal left atrial size. Right atrial size is normal.     Mitral Valve  There is trace mitral regurgitation.     Tricuspid Valve  No tricuspid regurgitation. Right ventricular systolic pressure could not be  approximated due to inadequate tricuspid regurgitation. IVC diameter <2.1 cm  collapsing >50% with sniff suggests a normal RA pressure of 3 mmHg.     Aortic Valve  The  aortic valve is trileaflet. No aortic regurgitation is present. No aortic  stenosis is present.     Pulmonic Valve  The pulmonic valve is not well seen, but is grossly normal.     Vessels  The aortic root is normal size.     Pericardium  There is no pericardial effusion.     Rhythm  The rhythm was sinus with wide QRS.  ______________________________________________________________________________  MMode/2D Measurements & Calculations  IVSd: 1.0 cm     LVIDd: 4.4 cm  LVIDs: 3.1 cm  LVPWd: 1.1 cm  FS: 29.4 %  LV mass(C)d: 158.2 grams  LV mass(C)dI: 67.4 grams/m2  Ao root diam: 3.6 cm  LA dimension: 3.5 cm  asc Aorta Diam: 3.1 cm  LA/Ao: 0.96  LVOT diam: 2.3 cm  LVOT area: 4.2 cm2  Ao root diam index Ht(cm/m): 1.9  Ao root diam index BSA (cm/m2): 1.5  Asc Ao diam index BSA (cm/m2): 1.3  Asc Ao diam index Ht(cm/m): 1.6  LA Volume (BP): 46.2 ml     LA Volume Index (BP): 19.7 ml/m2  RWT: 0.50  TAPSE: 2.1 cm     Doppler Measurements & Calculations  MV E max kuldip: 72.8 cm/sec  MV A max kuldip: 84.9 cm/sec  MV E/A: 0.86  MV dec time: 0.20 sec  Ao V2 max: 193.0 cm/sec  Ao max PG: 15.0 mmHg  Ao V2 mean: 135.0 cm/sec  Ao mean P.0 mmHg  Ao V2 VTI: 27.8 cm  ZINA(I,D): 3.6 cm2  ZINA(V,D): 3.6 cm2  LV V1 max PG: 10.8 mmHg  LV V1 max: 164.0 cm/sec  LV V1 VTI: 23.5 cm  SV(LVOT): 99.3 ml  SI(LVOT): 42.3 ml/m2  PA acc time: 0.12 sec  AV Kuldip Ratio (DI): 0.85  ZINA Index (cm2/m2): 1.5  E/E' av.4  Lateral E/e': 7.4     Medial E/e': 9.3  RV S Kuldip: 17.6 cm/sec     ______________________________________________________________________________  Report approved by: Savanah Mackey 2024 11:53 AM

## 2024-01-31 NOTE — PLAN OF CARE
A+Ox4 VSS on room air. Lung sounds dimnished, frequent productive cough. Tele NSR. Insulin gtt infusing with plans to transition at 0730. K+ replaced. Bowels active, flatus+, BM+. Voiding adequately. Denies pain. Baseline numbness in BLE present. Up SBA. Tolerated food trial.

## 2024-01-31 NOTE — SIGNIFICANT EVENT
Significant Event Note    Time of event: 4:27 AM January 31, 2024    Description of event:  Gap closed, tolerating PO intake    Plan:  C/w D5 drip and insulin drip for 3 more hours till 0730  33u glargine  ISS  If FS show improvement by 0730 during the transition, can consider initiating lispro 10U TID w/ meals    Anneliese Hall MD

## 2024-01-31 NOTE — PROGRESS NOTES
Neuro: A/Ox4, PERRLA, opens eyes spontaneously, follows commands.     CV: tele NSR.    Resp: LS clear/diminished, pt has a productive cough- PRN robitussin, mucinex, and cough drop given. Tolerating RA while resting and ambulating.    GI: BS active. No c/o nausea. On a 60gm carb diet.    : adequate urine output.     Skin: intact    Activity: SBA    Additional:     Pt taken of insulin gtt this am and started on insulin pens. This afternoon pt BS was in the 300's despite correction. MD notified, MD ordered high insulin coverage instead. Stated he was okay with his blood sugar.     Plan: Home O2 study eval tomorrow. Possible discharge.

## 2024-02-01 VITALS
HEIGHT: 74 IN | DIASTOLIC BLOOD PRESSURE: 89 MMHG | WEIGHT: 240 LBS | OXYGEN SATURATION: 98 % | HEART RATE: 97 BPM | RESPIRATION RATE: 19 BRPM | BODY MASS INDEX: 30.8 KG/M2 | SYSTOLIC BLOOD PRESSURE: 132 MMHG | TEMPERATURE: 98.6 F

## 2024-02-01 LAB
ANION GAP SERPL CALCULATED.3IONS-SCNC: 12 MMOL/L (ref 7–15)
BUN SERPL-MCNC: 16 MG/DL (ref 6–20)
CALCIUM SERPL-MCNC: 10 MG/DL (ref 8.6–10)
CHLORIDE SERPL-SCNC: 93 MMOL/L (ref 98–107)
CHOLEST SERPL-MCNC: 234 MG/DL
CREAT SERPL-MCNC: 0.85 MG/DL (ref 0.67–1.17)
DEPRECATED HCO3 PLAS-SCNC: 26 MMOL/L (ref 22–29)
EGFRCR SERPLBLD CKD-EPI 2021: >90 ML/MIN/1.73M2
GLUCOSE BLDC GLUCOMTR-MCNC: 290 MG/DL (ref 70–99)
GLUCOSE BLDC GLUCOMTR-MCNC: 340 MG/DL (ref 70–99)
GLUCOSE BLDC GLUCOMTR-MCNC: 382 MG/DL (ref 70–99)
GLUCOSE SERPL-MCNC: 322 MG/DL (ref 70–99)
HDLC SERPL-MCNC: 25 MG/DL
LDLC SERPL CALC-MCNC: ABNORMAL MG/DL
LDLC SERPL DIRECT ASSAY-MCNC: 119 MG/DL
NONHDLC SERPL-MCNC: 209 MG/DL
PHOSPHATE SERPL-MCNC: 3.8 MG/DL (ref 2.5–4.5)
POTASSIUM SERPL-SCNC: 3.7 MMOL/L (ref 3.4–5.3)
SODIUM SERPL-SCNC: 131 MMOL/L (ref 135–145)
TRIGL SERPL-MCNC: 504 MG/DL

## 2024-02-01 PROCEDURE — 36415 COLL VENOUS BLD VENIPUNCTURE: CPT | Performed by: HOSPITALIST

## 2024-02-01 PROCEDURE — 250N000013 HC RX MED GY IP 250 OP 250 PS 637: Performed by: HOSPITALIST

## 2024-02-01 PROCEDURE — 250N000012 HC RX MED GY IP 250 OP 636 PS 637: Performed by: HOSPITALIST

## 2024-02-01 PROCEDURE — 84100 ASSAY OF PHOSPHORUS: CPT | Performed by: HOSPITALIST

## 2024-02-01 PROCEDURE — 80061 LIPID PANEL: CPT | Performed by: HOSPITALIST

## 2024-02-01 PROCEDURE — 99207 PR NO BILLABLE SERVICE THIS VISIT: CPT | Performed by: HOSPITALIST

## 2024-02-01 PROCEDURE — 250N000011 HC RX IP 250 OP 636: Performed by: STUDENT IN AN ORGANIZED HEALTH CARE EDUCATION/TRAINING PROGRAM

## 2024-02-01 PROCEDURE — 80048 BASIC METABOLIC PNL TOTAL CA: CPT | Performed by: HOSPITALIST

## 2024-02-01 PROCEDURE — 99239 HOSP IP/OBS DSCHRG MGMT >30: CPT | Performed by: HOSPITALIST

## 2024-02-01 PROCEDURE — 83721 ASSAY OF BLOOD LIPOPROTEIN: CPT | Performed by: HOSPITALIST

## 2024-02-01 RX ORDER — CLOTRIMAZOLE 1 %
CREAM (GRAM) TOPICAL 2 TIMES DAILY
Qty: 15 G | Refills: 0 | Status: SHIPPED | OUTPATIENT
Start: 2024-02-01

## 2024-02-01 RX ORDER — NICOTINE 21 MG/24HR
1 PATCH, TRANSDERMAL 24 HOURS TRANSDERMAL DAILY
Qty: 7 PATCH | Refills: 0 | Status: SHIPPED | OUTPATIENT
Start: 2024-02-01 | End: 2024-02-07

## 2024-02-01 RX ORDER — IPRATROPIUM BROMIDE AND ALBUTEROL SULFATE 2.5; .5 MG/3ML; MG/3ML
1 SOLUTION RESPIRATORY (INHALATION) EVERY 6 HOURS PRN
Qty: 90 ML | Refills: 0 | Status: SHIPPED | OUTPATIENT
Start: 2024-02-01 | End: 2024-08-21

## 2024-02-01 RX ORDER — CLOTRIMAZOLE 1 %
CREAM (GRAM) TOPICAL 2 TIMES DAILY
Status: DISCONTINUED | OUTPATIENT
Start: 2024-02-01 | End: 2024-02-01 | Stop reason: HOSPADM

## 2024-02-01 RX ORDER — INSULIN GLARGINE 100 [IU]/ML
40 INJECTION, SOLUTION SUBCUTANEOUS EVERY MORNING
Qty: 15 ML | Refills: 0 | Status: SHIPPED | OUTPATIENT
Start: 2024-02-02 | End: 2024-02-29

## 2024-02-01 RX ADMIN — Medication 1 LOZENGE: at 02:06

## 2024-02-01 RX ADMIN — FLUTICASONE FUROATE AND VILANTEROL TRIFENATATE 1 PUFF: 100; 25 POWDER RESPIRATORY (INHALATION) at 08:38

## 2024-02-01 RX ADMIN — SODIUM BICARBONATE 650 MG TABLET 1300 MG: at 08:35

## 2024-02-01 RX ADMIN — PANTOPRAZOLE SODIUM 40 MG: 40 TABLET, DELAYED RELEASE ORAL at 06:39

## 2024-02-01 RX ADMIN — INSULIN GLARGINE 40 UNITS: 100 INJECTION, SOLUTION SUBCUTANEOUS at 07:37

## 2024-02-01 RX ADMIN — GUAIFENESIN 600 MG: 600 TABLET, EXTENDED RELEASE ORAL at 08:35

## 2024-02-01 RX ADMIN — ENOXAPARIN SODIUM 40 MG: 40 INJECTION SUBCUTANEOUS at 08:35

## 2024-02-01 RX ADMIN — Medication 1 LOZENGE: at 08:35

## 2024-02-01 RX ADMIN — FEXOFENADINE HYDROCHLORIDE AND PSEUDOEPHEDRINE HYDROCHLORIDE 1 TABLET: 60; 120 TABLET, FILM COATED, EXTENDED RELEASE ORAL at 08:35

## 2024-02-01 RX ADMIN — CHLORTHALIDONE 25 MG: 25 TABLET ORAL at 08:35

## 2024-02-01 RX ADMIN — Medication 1 LOZENGE: at 04:03

## 2024-02-01 RX ADMIN — GUAIFENESIN 200 MG: 200 SOLUTION ORAL at 00:05

## 2024-02-01 RX ADMIN — Medication 1 LOZENGE: at 06:54

## 2024-02-01 RX ADMIN — Medication 1 LOZENGE: at 10:07

## 2024-02-01 RX ADMIN — LISINOPRIL 40 MG: 40 TABLET ORAL at 08:35

## 2024-02-01 RX ADMIN — Medication 1 LOZENGE: at 09:15

## 2024-02-01 ASSESSMENT — ACTIVITIES OF DAILY LIVING (ADL)
ADLS_ACUITY_SCORE: 18
DEPENDENT_IADLS:: INDEPENDENT
ADLS_ACUITY_SCORE: 18

## 2024-02-01 NOTE — CONSULTS
NUTRITION EDUCATION      REASON FOR ASSESSMENT:  Reason for Consult: Provider Order - specify Reason    Reason for Consult: diabetic diet, dicharging today.        NUTRITION HISTORY:  Information obtained from chart review and d/w pt and his wife    New dx of T2DM  Lab Results   Component Value Date   A1C 16.5 01/30/2024     Wife reported pt has not had to monitor his PO intakes and what he eats before  Unaware of what foods have CHO     Called pt x3, no answer.     CURRENT DIET:  Mod CHO (60 g per meal)    NUTRITION DIAGNOSIS:  Food- and nutrition-related knowledge deficit R/t no prior diet education    INTERVENTIONS:  Nutrition Prescription:  Mod CHO    Implementation:      *  Nutrition Education (Content):   A)  Pt already had handouts: Living Healthy w/ Diabetes, Carbohydrate Counting   B)  Discussed what foods have CHO, recommended 75 g CHO per meal. Impact CHO has on BG, emphasized having fats, protein, fiber w/ meals to slow BG rise. Encouraged consistent CHO intake. Reviewed importance of heart healthy fats, proteins, lower sodium. Discussed how to find nutrition facts from chain restaurants. Can utilize internet for looking up CHO amount in foods such as an apple too.       *  Nutrition Education (Application):   A)  Discussed current eating habits and recommended alternative food choices      *  Anticipate good compliance      *  Diet Education - refer to Education Flowsheet    Goals:      *  Patient will verbalize understanding of diet by no further questions at this time. Pt plans to go over this with his wife. Also will go to diabetes educator.       *  All of the above goals met during the education session    Follow Up/Monitoring:      *  Recommended Out-Patient Nutrition Referral, if further diet instructions are needed    Maddie Dominguez RD, LD

## 2024-02-01 NOTE — PLAN OF CARE
Discharge Note    Patient discharged to home via private vehicle  accompanied by significant other .  IV: Discontinued  Prescriptions filled and given to patient/family.   Belongings reviewed and sent with patient.   Diabetic and nebulizer supplies sent with patient. Glucose monitoring and insulin education provided with hands on demonstration.  patient and family verbalize understanding of discharge instructions. AVS given to patient.

## 2024-02-01 NOTE — CONSULTS
Care Management Initial Consult    General Information  Assessment completed with: Patient,    Type of CM/SW Visit: Offer D/C Planning    Primary Care Provider verified and updated as needed: No   Readmission within the last 30 days: no previous admission in last 30 days      Reason for Consult: discharge planning  Advance Care Planning: Advance Care Planning Reviewed: no concerns identified          Communication Assessment  Patient's communication style: spoken language (English or Bilingual)    Hearing Difficulty or Deaf: no   Wear Glasses or Blind: no    Cognitive  Cognitive/Neuro/Behavioral: WDL                      Living Environment:   People in home: spouse  Carine  Current living Arrangements: apartment      Able to return to prior arrangements: yes       Family/Social Support:  Care provided by: self  Provides care for: no one  Marital Status:   Wife          Description of Support System:      Support Assessment: Adequate family and caregiver support    Current Resources:   Patient receiving home care services: No     Community Resources: None  Equipment currently used at home: none  Supplies currently used at home: None    Employment/Financial:  Employment Status: employed full-time     Employment/ Comments: [employer], travels, is taking a month off  Financial Concerns: none           Does the patient's insurance plan have a 3 day qualifying hospital stay waiver?  No      Functional Status:  Prior to admission patient needed assistance:   Dependent ADLs:: Independent  Dependent IADLs:: Independent       Mental Health Status:  Mental Health Status: No Current Concerns       Chemical Dependency Status:  Chemical Dependency Status: No Current Concerns             Values/Beliefs:  Spiritual, Cultural Beliefs, [employer]an Practices, Values that affect care: no               Additional Information:  Patient was called instead of meeting patient face to face due to covid.  Patient lives with  his spouse.  Patient reports his wife is a Hospice RN.  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  He is a new diabetic without a PCP.  After conversing with patient, an appointment has been scheduled at [facility] with Dr Jama on 2/7.  Patient will also need formal diabetic education.  Patient plans to be off work for at least another month.  A nebulizer has been ordered thru [facility].  They are aware that we are planning potential discharge today.  Nursing will ensure that patient is able to perform safe insulin injection technique and use of glucometer before he discharges.  Patient's spouse will be providing transportation home.    Care Management is available to help, if patient should have any other discharge needs.    Yoon Parra, RN  Inpatient Care Management  [phone]

## 2024-02-01 NOTE — PLAN OF CARE
Goal Outcome Evaluation:            Orientations: A&O x 4. Calm and cooperative.  Vitals/Pain: VSS. Patient c/o throat pain. Lozenges provided per MAR.   Tele: SR  Lines/Drains: PIV LFA  Skin/Wounds: Intact  GI/: Good bowel sounds. Adequate urine output.  Labs: Abnormal/Trends, Electrolyte Replacement-    Latest Reference Range & Units 01/31/24 14:22   Sodium 135 - 145 mmol/L 130 (L)   Potassium 3.4 - 5.3 mmol/L 3.7   Chloride 98 - 107 mmol/L 94 (L)   Carbon Dioxide (CO2) 22 - 29 mmol/L 26   Urea Nitrogen 6.0 - 20.0 mg/dL 17.1   Creatinine 0.67 - 1.17 mg/dL 0.69   GFR Estimate >60 mL/min/1.73m2 >90   Calcium 8.6 - 10.0 mg/dL 9.5   Anion Gap 7 - 15 mmol/L 10   Glucose 70 - 99 mg/dL 339 (H)   Ketone Quantitative <=0.30 mmol/L 0.40 (H)   (L): Data is abnormally low  (H): Data is abnormally high  Ambulation/Assist: SBA  Sleep Quality: Fair  Plan: Patient needs diabetic education. Pt also requesting Nystatin rinse for his mouth. Await medical clearance.

## 2024-02-01 NOTE — DISCHARGE SUMMARY
"[facility]  Hospitalist Discharge Summary      Date of Admission:  1/30/2024  Date of Discharge:  2/1/2024  Discharging Provider: Farhan Mcpherson DO  Discharge Service: Hospitalist Service    Discharge Diagnoses   # Diabetes mellitus type 2 with hyperglycemia, uncontrolled and new diagnosis  -# Hyperglycemia with mild DKA with elevated anion gap    Clinically Significant Risk Factors     # DMII: A1C = 16.5 % (Ref range: <5.7 %) within past 6 months  # Obesity: Estimated body mass index is 30.81 kg/m  as calculated from the following:    Height as of this encounter: 1.88 m (6' 2\").    Weight as of this encounter: 108.9 kg (240 lb).       Follow-ups Needed After Discharge   Follow-up Appointments     Follow-up and recommended labs and tests       Follow up with primary care provider, Physician No Ref-Primary, within 7   days to evaluate medication change and for hospital follow- up.  The   following labs/tests are recommended: bmp in 1 week. Please follow-up on   Lipid panel and consider statin. Needs insulin titration            Unresulted Labs Ordered in the Past 30 Days of this Admission       No orders found from 12/31/2023 to 1/31/2024.        These results will be followed up by PCP    Discharge Disposition   Discharged to home  Condition at discharge: Stable    Hospital Course   # Diabetes mellitus type 2 with hyperglycemia, uncontrolled and new diagnosis  -# Hyperglycemia with mild DKA with elevated anion gap  Patient previously was on metformin in September 2020  New diagnosis per patient  Started on lantus that was titrated up  Pt requesting discharge home on 2/1  Plan  - discussed with patient and wife in detail. Plan for discharge will be 40 units of lantus as well as high dose sliding scale insulin. Add carb counting as able but we will try not to overwhelm him with too complex a regimen to help  - keep in mind his AVERAGE blood sugar with an A1c is 430 for the last 90 days, " and so current readings of 200-300 are moving in the right direction and recurrent DKA risk is low  - wife lupe is RN and feels comfortable assisting  - referral to diabetic educator  - appreciate  arranging PCP appointment next week  - nutrition to visit before discharge today  - meter arranged     # COVID-19 infection  # Reactive airway disease  Patient not vaccinated does not have previous COVID-19  Was recently treated on steroids with minimal improvement of symptoms and then he was transitioned to a steroid taper.  Patient continues to have cough with mucopurulent sputum  Chest x-ray does not show any pneumonia  D- dimer negative and rules out PE  - echocardiogram negative  - Continue PTA inhalers  - Procal negative   - advised time and smoking cessation. Patches for 1 week and then stop  - ordered neb for his asthma along with prn duonebs        -Hypertension   restart PTA chlorthalidone and lisinopriil        # Tobacco abuse  Nicotine replacement protocol        # Pseudo hyponatremia  -Patient admission sodium was 116 corrected sodium is 130-136  This is pseudohypoglycemia due to high sugars  -CTM    Possible candidal balanitis, recurrent  Just prior to discharge patient report he has another rash that had responded well before for yeast treatment of his penis per the bedside RN. Was not able to evaluate prior to discharge. Will rx antifungal cream but may need additional workup with PCP          Consultations This Hospital Stay   CARE MANAGEMENT / SOCIAL WORK IP CONSULT    Code Status   Full Code    Time Spent on this Encounter   I, Farhan Mcpherson DO, personally saw the patient today and spent greater than 30 minutes discharging this patient.       Farhan Mcpherson DO  [place] [address] [place] [address]  Phone: [phone]  ______________________________________________________________________    Physical Exam   Vital Signs: Temp: 98.6   F (37  C) Temp src: Oral BP: 132/89 Pulse: 97   Resp: 19 SpO2: 98 % O2 Device: None (Room air)    Weight: 240 lbs 0 oz    Physical Exam  Cardiovascular:      Rate and Rhythm: Normal rate and regular rhythm.      Heart sounds: Normal heart sounds.   Pulmonary:      Effort: No respiratory distress.      Breath sounds: Normal breath sounds. No wheezing.   Abdominal:      General: There is no distension.      Palpations: Abdomen is soft.      Tenderness: There is no abdominal tenderness.   Musculoskeletal:      Right lower leg: No edema.      Left lower leg: No edema.        Primary Care Physician   Physician No Ref-Primary    Discharge Orders      Adult Diabetes Education  Referral      Reason for your hospital stay    Diabetic ketoacidosis  Post COVID syndrome     Follow-up and recommended labs and tests     Follow up with primary care provider, Physician No Ref-Primary, within 7 days to evaluate medication change and for hospital follow- up.  The following labs/tests are recommended: bmp in 1 week. Please follow-up on Lipid panel and consider statin. Needs insulin titration     Activity    Your activity upon discharge: activity as tolerated     Discharge Instructions    Keep a diary of blood sugar measurements to take to review with PCP     Nebulizer and Nebulizer Supplies    Nebulizer Documentation  I attest that I have seen and evaluated Alexey Arriaga. He has a diagnosis of J45.909 - Unspecified asthma, uncomplicated and a nebulizer machine is needed to administer medication to improve breathing passages.     I, the undersigned, certify that the above prescribed supplies are medically necessary for this patient and is both reasonable and necessary in reference to accepted standards of medical and necessary in reference to accepted standards of medical practice in the treatment of this patient's condition and is not prescribed as a convenience.     Diet    Follow this diet upon discharge: Orders  Placed This Encounter      Moderate Consistent Carb (60 g CHO per Meal) Diet       Significant Results and Procedures   Most Recent 3 CBC's:  Recent Labs   Lab Test 24  1345   WBC 13.0*   HGB 14.0   MCV 89        Most Recent 3 BMP's:  Recent Labs   Lab Test 24  0732 24  0530 24  0144 24  1658 24  1422 24  0919 24  0821   NA  --  131*  --   --  130*  --  131*   POTASSIUM  --  3.7  --   --  3.7  --  3.7  3.7   CHLORIDE  --  93*  --   --  94*  --  94*   CO2  --  26  --   --  26  --  25   BUN  --  16.0  --   --  17.1  --  13.4   CR  --  0.85  --   --  0.69  --  0.73   ANIONGAP  --  12  --   --  10  --  12   OREN  --  10.0  --   --  9.5  --  9.5   * 322* 382*   < > 339*   < > 183*    < > = values in this interval not displayed.     Most Recent 2 LFT's:  Recent Labs   Lab Test 24  1709   AST 10   ALT 13   ALKPHOS 119   BILITOTAL 0.5   ,   Results for orders placed or performed during the hospital encounter of 24   Chest XR,  PA & LAT    Narrative    CHEST TWO VIEWS 2024 2:15 PM     HISTORY: Cough, shortness of breath.    COMPARISON: None available.       Impression    IMPRESSION: No focal consolidation, pleural effusion or pneumothorax.  Cardiomediastinal silhouette is unremarkable.    ARIN CARSON MD         SYSTEM ID:  [id number]   Echocardiogram Complete     Value    LVEF  60-65%    Narrative    [id number]  [id number]  [id number]  [id number]^HINA^ALIN^NICK     [facility]  Echocardiography Laboratory  [address] [place] [id number]     Name: MONY ARCHIBALD  MRN: [id number]  : 1980  Study Date: 2024 11:06 AM  Age: 43 yrs  Gender: Male  Patient Location: [facility]  Reason For Study: Dyspnea  Ordering Physician: ALIN SANTAMARIA  Referring Physician: ALIN SANTAMARIA  Performed By: Susy Shah     BSA: 2.3 m2  Height: 74 in  Weight: 240 lb  HR: 99  BP: 121/73  mmHg  ______________________________________________________________________________  Procedure  Complete Portable Echo Adult. Optison (NDC #[id number]) given intravenously.  ______________________________________________________________________________  Interpretation Summary     Left ventricular systolic function is normal.  The visual ejection fraction is 60-65%.  No regional wall motion abnormalities noted.  The study was technically adequate. There is no comparison study available.  ______________________________________________________________________________  Left Ventricle  The left ventricle is normal in size. There is normal left ventricular wall  thickness. Left ventricular systolic function is normal. The visual ejection  fraction is 60-65%. Left ventricular diastolic function is normal. No regional  wall motion abnormalities noted.     Right Ventricle  The right ventricle is normal size. The right ventricular systolic function is  normal.     Atria  Normal left atrial size. Right atrial size is normal.     Mitral Valve  There is trace mitral regurgitation.     Tricuspid Valve  No tricuspid regurgitation. Right ventricular systolic pressure could not be  approximated due to inadequate tricuspid regurgitation. IVC diameter <2.1 cm  collapsing >50% with sniff suggests a normal RA pressure of 3 mmHg.     Aortic Valve  The aortic valve is trileaflet. No aortic regurgitation is present. No aortic  stenosis is present.     Pulmonic Valve  The pulmonic valve is not well seen, but is grossly normal.     Vessels  The aortic root is normal size.     Pericardium  There is no pericardial effusion.     Rhythm  The rhythm was sinus with wide QRS.  ______________________________________________________________________________  MMode/2D Measurements & Calculations  IVSd: 1.0 cm     LVIDd: 4.4 cm  LVIDs: 3.1 cm  LVPWd: 1.1 cm  FS: 29.4 %  LV mass(C)d: 158.2 grams  LV mass(C)dI: 67.4 grams/m2  Ao root diam: 3.6 cm  LA  dimension: 3.5 cm  asc Aorta Diam: 3.1 cm  LA/Ao: 0.96  LVOT diam: 2.3 cm  LVOT area: 4.2 cm2  Ao root diam index Ht(cm/m): 1.9  Ao root diam index BSA (cm/m2): 1.5  Asc Ao diam index BSA (cm/m2): 1.3  Asc Ao diam index Ht(cm/m): 1.6  LA Volume (BP): 46.2 ml     LA Volume Index (BP): 19.7 ml/m2  RWT: 0.50  TAPSE: 2.1 cm     Doppler Measurements & Calculations  MV E max kuldip: 72.8 cm/sec  MV A max kuldip: 84.9 cm/sec  MV E/A: 0.86  MV dec time: 0.20 sec  Ao V2 max: 193.0 cm/sec  Ao max PG: 15.0 mmHg  Ao V2 mean: 135.0 cm/sec  Ao mean P.0 mmHg  Ao V2 VTI: 27.8 cm  ZINA(I,D): 3.6 cm2  ZINA(V,D): 3.6 cm2  LV V1 max PG: 10.8 mmHg  LV V1 max: 164.0 cm/sec  LV V1 VTI: 23.5 cm  SV(LVOT): 99.3 ml  SI(LVOT): 42.3 ml/m2  PA acc time: 0.12 sec  AV Kuldip Ratio (DI): 0.85  ZINA Index (cm2/m2): 1.5  E/E' av.4  Lateral E/e': 7.4     Medial E/e': 9.3  RV S Kuldip: 17.6 cm/sec     ______________________________________________________________________________  Report approved by: Savanah Mackey 2024 11:53 AM             Discharge Medications   Current Discharge Medication List        START taking these medications    Details   benzocaine-menthol (CHLORASEPTIC) 6-10 MG lozenge Place 1 lozenge inside cheek every hour as needed for sore throat  Qty: 72 lozenge, Refills: 0    Associated Diagnoses: Hyperglycemia      blood glucose (NO BRAND SPECIFIED) test strip Use to test blood sugar 4 times daily or as directed.  Qty: 100 strip, Refills: 6    Comments: To accompany: glucometer per insurance.  Associated Diagnoses: Hyperglycemia      blood glucose monitoring (NO BRAND SPECIFIED) meter device kit Use to test blood sugar 4 times daily or as directed.  Qty: 1 kit, Refills: 0    Comments: Preferred blood glucose meter OR supplies to accompany: glucometer per insurance  Associated Diagnoses: Hyperglycemia      insulin aspart (NOVOLOG PEN) 100 UNIT/ML pen Inject 1-10 Units Subcutaneous 3 times daily (before meals) Correction Scale - HIGH  INSULIN RESISTANCE DOSING   Do Not give Correction Insulin if Pre-Meal BG less than 140. For Pre-Meal  - 164 give 1 unit. For Pre-Meal  - 189 give 2 units. For Pre-Meal  - 214 give 3 units. For Pre-Meal  - 239 give 4 units. For Pre-Meal  - 264 give 5 units. For Pre-Meal  - 289 give 6 units. For Pre-Meal  - 314 give 7 units. For Pre-Meal  - 339 give 8 units. For Pre-Meal  - 364 give 9 units.  For Pre-Meal BG greater than or equal to 365 give 10 units To be given with prandial insulin, and based on pre-meal blood glucose. Administering insulin within 5 minutes of the start of the meal is ideal. Administer insulin no more than 30 minutes after the start of the meal, unless directed otherwise by provider. Notify provider if glucose greater than or equal to 350 mg/dL after administration of correction dose.  Qty: 27 mL, Refills: 0    Associated Diagnoses: Hyperglycemia      insulin glargine 100 UNIT/ML pen Inject 40 Units Subcutaneous every morning  Qty: 15 mL, Refills: 0    Comments: If Lantus is not covered by insurance, may substitute Basaglar or Semglee or other insulin glargine product per insurance preference at same dose and frequency.    Associated Diagnoses: Hyperglycemia      ipratropium - albuterol 0.5 mg/2.5 mg/3 mL (DUONEB) 0.5-2.5 (3) MG/3ML neb solution Take 1 vial (3 mLs) by nebulization every 6 hours as needed for shortness of breath, wheezing or cough  Qty: 90 mL, Refills: 0    Associated Diagnoses: Hyperglycemia      nicotine (NICODERM CQ) 14 MG/24HR 24 hr patch Place 1 patch onto the skin daily  Qty: 7 patch, Refills: 0    Associated Diagnoses: Hyperglycemia      thin (NO BRAND SPECIFIED) lancets Use with lanceting device.  Qty: 200 each, Refills: 6    Comments: To accompany: per insurance.  Associated Diagnoses: Hyperglycemia           CONTINUE these medications which have NOT CHANGED    Details   albuterol (PROAIR HFA/PROVENTIL HFA/VENTOLIN HFA)  108 (90 Base) MCG/ACT inhaler Inhale 2 puffs into the lungs every 4 hours as needed for shortness of breath or wheezing      chlorthalidone (HYGROTON) 25 MG tablet Take 25 mg by mouth daily      fexofenadine-pseudoePHEDrine (ALLEGRA-D)  MG 12 hr tablet Take 1 tablet by mouth 2 times daily      fluticasone-salmeterol (ADVAIR) 250-50 MCG/ACT inhaler Inhale 1 puff into the lungs 2 times daily      guaiFENesin (MUCINEX) 600 MG 12 hr tablet Take 600 mg by mouth 2 times daily      lisinopril (ZESTRIL) 40 MG tablet Take 40 mg by mouth daily      naproxen sodium (ANAPROX) 220 MG tablet Take 440 mg by mouth daily      omeprazole-sodium bicarbonate (ZEGERID)  MG capsule Take 1 capsule by mouth every morning (before breakfast)           Allergies   No Known Allergies

## 2024-02-01 NOTE — PROVIDER NOTIFICATION
"MD Notification    Notified Person: MD    Notified Person Name:  Dr. Rodriguez    Notification Date/Time:  2223    Notification Interaction:  Vocera page    Purpose of Notification: \"Thrush was noted when pt was admitted, and he got one dose of Nystatin in the ED, but no more is ordered. Should he have more doses ordered? Thanks!\"     Orders Received: No new orders        "

## 2024-02-02 ENCOUNTER — PATIENT OUTREACH (OUTPATIENT)
Dept: CARE COORDINATION | Facility: CLINIC | Age: 44
End: 2024-02-02
Payer: COMMERCIAL

## 2024-02-02 LAB
ATRIAL RATE - MUSE: 91 BPM
DIASTOLIC BLOOD PRESSURE - MUSE: NORMAL MMHG
INTERPRETATION ECG - MUSE: NORMAL
P AXIS - MUSE: 12 DEGREES
PR INTERVAL - MUSE: 164 MS
QRS DURATION - MUSE: 144 MS
QT - MUSE: 404 MS
QTC - MUSE: 496 MS
R AXIS - MUSE: -25 DEGREES
SYSTOLIC BLOOD PRESSURE - MUSE: NORMAL MMHG
T AXIS - MUSE: 22 DEGREES
VENTRICULAR RATE- MUSE: 91 BPM

## 2024-02-02 NOTE — PROGRESS NOTES
[facility]: [facility]: Post-Discharge Note  SITUATION                                                      Admission:    Admission Date: 01/30/24   Reason for Admission: Diabetes mellitus type 2 with hyperglycemia, uncontrolled and new diagnosis  Discharge:   Discharge Date: 02/01/24  Discharge Diagnosis: Diabetes mellitus type 2 with hyperglycemia, uncontrolled and new diagnosis    BACKGROUND                                                      Per hospital discharge summary and inpatient provider notes:  Patient previously was on metformin in September 2020  New diagnosis per patient  Started on lantus that was titrated up  Pt requesting discharge home on 2/1  Plan  - discussed with patient and wife in detail. Plan for discharge will be 40 units of lantus as well as high dose sliding scale insulin. Add carb counting as able but we will try not to overwhelm him with too complex a regimen to help  - keep in mind his AVERAGE blood sugar with an A1c is 430 for the last 90 days, and so current readings of 200-300 are moving in the right direction and recurrent DKA risk is low  - wife lupe is RN and feels comfortable assisting  - referral to diabetic educator  - appreciate  arra      ASSESSMENT        Discharge Assessment  How are you doing now that you are home?: Okay; moving slower than usual.  How are your symptoms? (Red Flag symptoms escalate to triage hotline per guidelines): Improved  Do you feel your condition is stable enough to be safe at home until your provider visit?: Yes  Does the patient have their discharge instructions? : Yes  Does the patient have questions regarding their discharge instructions? : No  Were you started on any new medications or were there changes to any of your previous medications? : Yes  Does the patient have all of their medications?: Yes  Do you have questions regarding any of your medications? : No  Do you have all of your needed medical  supplies or equipment (DME)?  (i.e. oxygen tank, CPAP, cane, etc.): Yes  Discharge follow-up appointment scheduled within 14 calendar days? : Yes  Discharge Follow Up Appointment Date: 02/07/24  Discharge Follow Up Appointment Scheduled with?: Primary Care Provider    Post-op ([facility] Only)  If the patient had a surgery or procedure, do they have any questions for a nurse?: No    PLAN                                                      Outpatient Plan:  Follow up with primary care provider, Physician No Ref-Primary, within 7 days to evaluate medication change and for hospital follow- up. The following labs/tests are recommended: bmp in 1 week. Please follow-up on Lipid panel and consider statin. Needs insulin titration     Future Appointments   Date Time Provider Department Center   2/7/2024 11:00 AM Yanet Jama MD [facility]   2/7/2024  1:30 PM Corina Russell RN [facility]         For any urgent concerns, please contact our 24 hour nurse triage line: [phone] ([phone])         LILIAN Higgins  [phone]  Connected Care Resource [facility]

## 2024-02-07 ENCOUNTER — OFFICE VISIT (OUTPATIENT)
Dept: FAMILY MEDICINE | Facility: CLINIC | Age: 44
End: 2024-02-07
Payer: COMMERCIAL

## 2024-02-07 ENCOUNTER — ALLIED HEALTH/NURSE VISIT (OUTPATIENT)
Dept: EDUCATION SERVICES | Facility: CLINIC | Age: 44
End: 2024-02-07
Attending: HOSPITALIST
Payer: COMMERCIAL

## 2024-02-07 VITALS
BODY MASS INDEX: 31.97 KG/M2 | DIASTOLIC BLOOD PRESSURE: 60 MMHG | WEIGHT: 249 LBS | SYSTOLIC BLOOD PRESSURE: 102 MMHG | HEART RATE: 94 BPM | OXYGEN SATURATION: 94 % | RESPIRATION RATE: 16 BRPM | TEMPERATURE: 97.7 F

## 2024-02-07 DIAGNOSIS — R73.9 HYPERGLYCEMIA: ICD-10-CM

## 2024-02-07 DIAGNOSIS — E11.9 TYPE 2 DIABETES MELLITUS (H): Primary | ICD-10-CM

## 2024-02-07 DIAGNOSIS — I10 BENIGN ESSENTIAL HYPERTENSION: ICD-10-CM

## 2024-02-07 DIAGNOSIS — Z09 HOSPITAL DISCHARGE FOLLOW-UP: Primary | ICD-10-CM

## 2024-02-07 DIAGNOSIS — E11.9 NEW ONSET OF TYPE 2 DIABETES MELLITUS IN PEDIATRIC PATIENT (H): ICD-10-CM

## 2024-02-07 DIAGNOSIS — Z71.6 ENCOUNTER FOR SMOKING CESSATION COUNSELING: ICD-10-CM

## 2024-02-07 DIAGNOSIS — J45.41 MODERATE PERSISTENT ASTHMA WITH ACUTE EXACERBATION: ICD-10-CM

## 2024-02-07 DIAGNOSIS — J30.2 SEASONAL ALLERGIC RHINITIS, UNSPECIFIED TRIGGER: ICD-10-CM

## 2024-02-07 DIAGNOSIS — K21.9 GASTROESOPHAGEAL REFLUX DISEASE WITHOUT ESOPHAGITIS: ICD-10-CM

## 2024-02-07 DIAGNOSIS — E11.00 HYPEROSMOLAR HYPERGLYCEMIC STATE (HHS) (H): ICD-10-CM

## 2024-02-07 PROCEDURE — 80053 COMPREHEN METABOLIC PANEL: CPT | Performed by: FAMILY MEDICINE

## 2024-02-07 PROCEDURE — 82043 UR ALBUMIN QUANTITATIVE: CPT | Performed by: FAMILY MEDICINE

## 2024-02-07 PROCEDURE — 99495 TRANSJ CARE MGMT MOD F2F 14D: CPT | Performed by: FAMILY MEDICINE

## 2024-02-07 PROCEDURE — 36415 COLL VENOUS BLD VENIPUNCTURE: CPT | Performed by: FAMILY MEDICINE

## 2024-02-07 PROCEDURE — 82570 ASSAY OF URINE CREATININE: CPT | Performed by: FAMILY MEDICINE

## 2024-02-07 PROCEDURE — G0108 DIAB MANAGE TRN  PER INDIV: HCPCS | Mod: AE

## 2024-02-07 PROCEDURE — 99207 PR FOOT EXAM NO CHARGE: CPT | Performed by: FAMILY MEDICINE

## 2024-02-07 RX ORDER — OMEPRAZOLE AND SODIUM BICARBONATE 40; 1100 MG/1; MG/1
1 CAPSULE ORAL
COMMUNITY
Start: 2024-02-07

## 2024-02-07 RX ORDER — LISINOPRIL 40 MG/1
40 TABLET ORAL DAILY
Qty: 90 TABLET | Refills: 1 | Status: SHIPPED | OUTPATIENT
Start: 2024-02-07 | End: 2024-08-06

## 2024-02-07 RX ORDER — FLUTICASONE PROPIONATE AND SALMETEROL 250; 50 UG/1; UG/1
1 POWDER RESPIRATORY (INHALATION) 2 TIMES DAILY
Qty: 60 EACH | Refills: 1 | Status: SHIPPED | OUTPATIENT
Start: 2024-02-07 | End: 2024-02-28

## 2024-02-07 RX ORDER — NICOTINE 21 MG/24HR
1 PATCH, TRANSDERMAL 24 HOURS TRANSDERMAL DAILY
Qty: 30 PATCH | Refills: 0 | Status: SHIPPED | OUTPATIENT
Start: 2024-02-07

## 2024-02-07 RX ORDER — MONTELUKAST SODIUM 10 MG/1
10 TABLET ORAL AT BEDTIME
Qty: 30 TABLET | Refills: 1 | Status: SHIPPED | OUTPATIENT
Start: 2024-02-07 | End: 2024-03-06

## 2024-02-07 RX ORDER — CHLORTHALIDONE 25 MG/1
25 TABLET ORAL DAILY
Qty: 90 TABLET | Refills: 1 | Status: SHIPPED | OUTPATIENT
Start: 2024-02-07 | End: 2024-08-06

## 2024-02-07 ASSESSMENT — PAIN SCALES - GENERAL: PAINLEVEL: NO PAIN (0)

## 2024-02-07 NOTE — LETTER
February 7, 2024      Alexey Arriaga  [id number]   [place] [address]        To Whom It May Concern:    Alexey Arriaga was seen in our clinic.  He missed from 01/26/ today    He will  not be able to return to work until 02/26/24       Sincerely,      Yanet Jama

## 2024-02-07 NOTE — LETTER
2024         RE: Alexey Arriaga  [address]  [place] [address]        Dear Colleague,    Thank you for referring your patient, Alexey Arriaga, to the [facility] [place]. Please see a copy of my visit note below.    Diabetes Self-Management Education & Support    Presents for: Initial Assessment for new diagnosis    Type of Service: In Person Visit      ASSESSMENT:  Pt referred to [facility] for new diagnosis of Type 2 Diabetes. Admitted to hospital from  -  for hyperglycemia with mild DKA. Received some diabetes education while in-patient. Spouse is a nurse. Both presented for education today.     Given an Accu-Check meter. Self taught. Has been checking bg four times a day: fasting and before breakfast, lunch and dinner.     Discharged on 40 units Lantus daily and Novolog sliding scale based on pre-meal b unit for every 25 points starting at 140.     Not necessarily counting carbs but was advised by RD in hospital to keep carb count at 60-65 per meal.     BG LOG: readings are pre-meal and bedtime:   : 353/316/422/250  : 276/505/479/419  : 311/421/372/324  : 293/--/299/--  : 236/281/311/--  : 175/--/--/234  : 230      Patient's most recent   Lab Results   Component Value Date    A1C 16.5 2024     is not meeting goal of <7.0    Diabetes knowledge and skills assessment:   Patient is knowledgeable in diabetes management concepts related to: Needs new onset education.     Continue education with the following diabetes management concepts: Healthy Eating, Being Active, Monitoring, and Taking Medication    Based on learning assessment above, most appropriate setting for further diabetes education would be: Individual setting.      PLAN  - Lantus: increase dose to 45 units daily (takes in pm)  - Novolog: start a base dose of 6 units with B/L/D and add current sliding scale dose when necessary of 1 unit for  "every 25 points starting at 140.   - Check bg four times a day: before meals and 2 hrs after dinner.   - Sign up for MyChart  - Message CDCES in one week if fasting bg remains above 150.     Topics to cover at upcoming visits: Healthy Eating, Being Active, Monitoring, and Taking Medication    Follow-up:   Rtc to see CDCES in 3 weeks.   Has appt with Pcp in 2.5 weeks.     See Care Plan for co-developed, patient-state behavior change goals.  AVS provided for patient today.    Education Materials Provided:  [employer] Healthy Living with Diabetes Book and Hypoglycemia Signs and Symptoms      SUBJECTIVE/OBJECTIVE:  Presents for: Initial Assessment for new diagnosis  Accompanied by: Spouse (Almaz)  Diabetes education in the past 24mo: Yes (minimal while in the hospital)  Diabetes type: Type 2  Date of diagnosis: 01/24  Disease course: Improving  How confident are you filling out medical forms by yourself:: Not Assessed  Diabetes management related comments/concerns: Improving fatigue and bg levels.  Transportation concerns: No  Difficulty affording diabetes medication?: No  Difficulty affording diabetes testing supplies?: No  Other concerns:: None  Cultural Influences/Ethnic Background:  Not  or       Diabetes Symptoms & Complications:  Diabetes Related Symptoms: Fatigue  Weight trend: Stable  Symptom course: Stable  Disease course: Improving  Complications assessed today?: No    Patient Problem List and Family Medical History reviewed for relevant medical history, current medical status, and diabetes risk factors.    Vitals:  There were no vitals taken for this visit.  Estimated body mass index is 31.97 kg/m  as calculated from the following:    Height as of 1/30/24: 1.88 m (6' 2\").    Weight as of an earlier encounter on 2/7/24: 112.9 kg (249 lb).   Last 3 BP:   BP Readings from Last 3 Encounters:   02/07/24 102/60   02/01/24 132/89       History   Smoking Status     Every Day     Packs/day: 1.00 " "    Types: Cigarettes   Smokeless Tobacco     Never       Labs:  Lab Results   Component Value Date    A1C 16.5 01/30/2024     Lab Results   Component Value Date     02/01/2024     Lab Results   Component Value Date    LDL  02/01/2024      Comment:      Cannot estimate LDL when triglyceride exceeds 400 mg/dL     02/01/2024     Direct Measure HDL   Date Value Ref Range Status   02/01/2024 25 (L) >=40 mg/dL Final   ]  GFR Estimate   Date Value Ref Range Status   02/01/2024 >90 >60 mL/min/1.73m2 Final     No results found for: \"GFRESTBLACK\"  Lab Results   Component Value Date    CR 0.85 02/01/2024     No results found for: \"MICROALBUMIN\"    Healthy Eating:  Healthy Eating Assessed Today: Yes  Cultural/Mormon diet restrictions?: No  Do you have any food allergies or intolerances?: Yes (mild intolerance to shellfish)  Meal planning/habits: Carb counting, Smaller portions  Who cooks/prepares meals for you?: Self, Spouse  Who purchases food in  your home?: Self, Spouse  How many times a week on average do you eat food made away from home (restaurant/take-out)?: 1    Breakfast: Omlette with english muffin with peanut butter and jam  Lunch: Leftovers or mixed green salad with a source of protein  Dinner: Pork roast with carrots and potatoes or steak with roasted vegetables or a poke bowl with rice  Snacks: beef sticks or pickled herring or nuts  Other: Beverages are diet. Coffee has a little cream  Beverages: Coffee, Water, Sports drinks (Gatorade Zero)  Has patient met with a dietitian in the past?: No    Being Active:  Being Active Assessed Today: Yes  Exercise:: Yes  Days per week of moderate to strenuous exercise (like a brisk walk): 7 (Walks dog 7 days a week. Some lifting involved with work.). Still quite fatigued but will plan on increasing intensity of exercise within the next few weeks.   On average, minutes per day of exercise at this level: 20  Exercise Minutes per Week: 140  Barrier to exercise: " Other (Asthma and currently still feeling fatigued due to new dx of diabetes and recent Covid infection.)    Monitoring:  Monitoring Assessed Today: Yes  Did patient bring glucose meter to appointment? : No (brought log book).  Blood Glucose Meter: Accu-chek  Times checking blood sugar at home (number): 4   Times checking blood sugar at home (per): Day  Blood glucose trend: Decreasing      Taking Medications:  Diabetes Medication(s)       Insulin       insulin aspart (NOVOLOG PEN) 100 UNIT/ML pen Inject 1-10 Units Subcutaneous 3 times daily (before meals) Correction Scale - HIGH INSULIN RESISTANCE DOSING   Do Not give Correction Insulin if Pre-Meal BG less than 140. For Pre-Meal  - 164 give 1 unit. For Pre-Meal  - 189 give 2 units. For Pre-Meal  - 214 give 3 units. For Pre-Meal  - 239 give 4 units. For Pre-Meal  - 264 give 5 units. For Pre-Meal  - 289 give 6 units. For Pre-Meal  - 314 give 7 units. For Pre-Meal  - 339 give 8 units. For Pre-Meal  - 364 give 9 units.  For Pre-Meal BG greater than or equal to 365 give 10 units To be given with prandial insulin, and based on pre-meal blood glucose. Administering insulin within 5 minutes of the start of the meal is ideal. Administer insulin no more than 30 minutes after the start of the meal, unless directed otherwise by provider. Notify provider if glucose greater than or equal to 350 mg/dL after administration of correction dose.     insulin glargine 100 UNIT/ML pen Inject 40 Units Subcutaneous every morning            Taking Medication Assessed Today: Yes  Current Treatments: Insulin Injections  Dose schedule: Pre-breakfast, Pre-lunch, Pre-dinner, At bedtime  Given by: Patient  Injection/Infusion sites: Abdomen  Problems taking diabetes medications regularly?: No  Diabetes medication side effects?: No    Problem Solving:  Problem Solving Assessed Today: No  Is the patient at risk for hypoglycemia?:  Yes  Hypoglycemia Frequency: Never  Hypoglycemia Treatment: Glucose (tablets or gel), Juice  Medical ID: No    Reducing Risks:  Reducing Risks Assessed Today: No  Diabetes Risks: Family History  CAD Risks: Hypertension, Male sex, Obesity  Has dilated eye exam at least once a year?: No  Sees dentist every 6 months?: No  Feet checked by healthcare provider in the last year?: No    Healthy Coping:  Healthy Coping Assessed Today: No  Emotional response to diabetes: Ready to learn  Informal Support system:: Spouse  Stage of change: PREPARATION (Decided to change - considering how)    Patient Activation Measure Survey Score:       No data to display                Care Plan and Education Provided:  See above.     Time Spent: 60 minutes  Encounter Type: Individual    Any diabetes medication dose changes were made via the CDE Protocol per the patient's primary care provider. A copy of this encounter was shared with the provider.    Croina Russell, RN, BSN, [facility]   Certified Diabetes Care &   [facility]

## 2024-02-07 NOTE — LETTER
February 19, 2024      Dennis Lee Steendustin  [address]   [place] [address]        Dear ,    We are writing to inform you of your test results.    Normal urine micro albumin (diabetes test for kidneys)   Normal  liver function tests, kidney functions, and  electrolytes(various salts in your body) except high  glucose   Need to continue to take care of your diabetes and follow up as previously planned and discuss during office visit.   Resulted Orders   Albumin Random Urine Quantitative with Creat Ratio   Result Value Ref Range    Creatinine Urine mg/dL 50.8 mg/dL      Comment:      The reference ranges have not been established in urine creatinine. The results should be integrated into the clinical context for interpretation.    Albumin Urine mg/L <12.0 mg/L      Comment:      The reference ranges have not been established in urine albumin. The results should be integrated into the clinical context for interpretation.    Albumin Urine mg/g Cr        Comment:      Unable to calculate, urine albumin and/or urine creatinine is outside detectable limits.  Microalbuminuria is defined as an albumin:creatinine ratio of 17 to 299 for males and 25 to 299 for females. A ratio of albumin:creatinine of 300 or higher is indicative of overt proteinuria.  Due to biologic variability, positive results should be confirmed by a second, first-morning random or 24-hour timed urine specimen. If there is discrepancy, a third specimen is recommended. When 2 out of 3 results are in the microalbuminuria range, this is evidence for incipient nephropathy and warrants increased efforts at glucose control, blood pressure control, and institution of therapy with an angiotensin-converting-enzyme (ACE) inhibitor (if the patient can tolerate it).     Comprehensive metabolic panel   Result Value Ref Range    Sodium 136 135 - 145 mmol/L      Comment:      Reference intervals for this test were updated on 09/26/2023 to more  accurately reflect our healthy population. There may be differences in the flagging of prior results with similar values performed with this method. Interpretation of those prior results can be made in the context of the updated reference intervals.     Potassium 4.1 3.4 - 5.3 mmol/L    Carbon Dioxide (CO2) 27 22 - 29 mmol/L    Anion Gap 10 7 - 15 mmol/L    Urea Nitrogen 18.5 6.0 - 20.0 mg/dL    Creatinine 0.92 0.67 - 1.17 mg/dL    GFR Estimate >90 >60 mL/min/1.73m2    Calcium 9.4 8.6 - 10.0 mg/dL    Chloride 99 98 - 107 mmol/L    Glucose 230 (H) 70 - 99 mg/dL    Alkaline Phosphatase 79 40 - 150 U/L      Comment:      Reference intervals for this test were updated on 11/14/2023 to more accurately reflect our healthy population. There may be differences in the flagging of prior results with similar values performed with this method. Interpretation of those prior results can be made in the context of the updated reference intervals.    AST 16 0 - 45 U/L      Comment:      Reference intervals for this test were updated on 6/12/2023 to more accurately reflect our healthy population. There may be differences in the flagging of prior results with similar values performed with this method. Interpretation of those prior results can be made in the context of the updated reference intervals.    ALT 21 0 - 70 U/L      Comment:      Reference intervals for this test were updated on 6/12/2023 to more accurately reflect our healthy population. There may be differences in the flagging of prior results with similar values performed with this method. Interpretation of those prior results can be made in the context of the updated reference intervals.      Protein Total 7.0 6.4 - 8.3 g/dL    Albumin 4.0 3.5 - 5.2 g/dL    Bilirubin Total 0.3 <=1.2 mg/dL       If you have any questions or concerns, please call the clinic at the number listed above.       Sincerely,      Yanet Jama MD

## 2024-02-07 NOTE — COMMUNITY RESOURCES LIST (ENGLISH)
02/07/2024   [facility]  N/A  For questions about this resource list or additional care needs, please contact your primary care clinic or care manager.  Phone: [phone]   Email: N/A   Address: [address] [place] [id number]   Hours: N/A        Food and Nutrition       Food pantry  1  [employer] People (PROP) Distance: 1.54 miles      Pickup   [address] [place] [address]  Language: English, Micronesian  Hours: Mon - Tue 9:30 AM - 1:00 PM , Wed 3:00 PM - 6:30 PM , Thu - Fri 9:30 AM - 1:00 PM  Fees: Free   Phone: (921) 290-6295 Email: prop@DiscountDoc.org Website: http://www.DiscountDoc.org     2  [facility] ([facility] - [employer] - Emergency Bags Distance: 4.42 miles      In-Person, Pickup   [address] [place] [address]  Language: English, Mauritian, Micronesian  Hours: Mon 12:00 AM - 7:00 PM , Tue 10:00 AM - 3:00 PM , Wed - Thu 10:00 AM - 2:30 PM  Fees: Free   Phone: (576) 830-4362 Email: ica@Patientco.org Website: http://www.Patientco.org     SNAP application assistance  3  People Reaching Out [employer] Other People (PROP) Distance: 1.54 miles      In-Person, Phone/Virtual   [address] [place] [address]  Language: English, Micronesian  Hours: Mon - Tue 9:30 AM - 1:00 PM , Wed 3:00 PM - 6:30 PM , Thu - Fri 9:30 AM - 1:00 PM  Fees: Free   Phone: (724) 650-8894 Email: prop@[employer].org Website: http://www.DiscountDoc.org     4  [facility] Distance: 7.38 miles      In-Person, Phone/Virtual   [address] [place] [address]  Language: English, Micronesian  Hours: Mon - Fri 9:00 AM - 4:30 PM  Fees: Free   Phone: (422) 252-2668 Ext.113 Email: info@veap.org Website: https://Pict.org     Soup kitchen or free meals  5  [facility] Distance: 5.19 miles      [facility]   [address] [place] [address]  Language: English  Hours: Mon - Tue 5:30 PM -  6:30 PM , Sat - Sun 5:30 PM - 6:30 PM  Fees: Free   Phone: (305) 738-9517 Email: office@[employer].Umbrella Here Website: https://www.NEXGRID.Umbrella Here     6  [facility] [place] [facility] Distance: 6.56 miles      Pick   [address] [place] [address]  Language: English  Hours: Sat 5:00 PM - 7:00 PM , Sun 5:30 PM - 6:30 PM  Fees: Free   Phone: (897) 216-5140 Email: office@[employer].Umbrella Here Website: http://Hal[employer]icsMediamind/          Important Numbers & Websites       Emergency Services   911  City Services   311  Poison Control   (316) 939-1050  Suicide Prevention Lifeline   (501) 245-6898 (TALK)  Child Abuse Hotline   (191) 810-1785 (4-A-Child)  Sexual Assault Hotline   (876) 370-3240 (HOPE)  National Runaway Safeline   (251) 863-2133 (RUNAWAY)  All-Options Talkline   (138) 859-9575  Substance Abuse Referral   (707) 946-5165 (HELP)

## 2024-02-07 NOTE — PATIENT INSTRUCTIONS
Increase Lantus from 40 to 45 units daily.      Start a base dose of Novolog with each meal: 6 units. Add current Sliding Scale dose, when necessary, to the 6 units: 1 unit per 25 points starting at 140.     Check Bg four times a day: before breakfast, lunch and inner and 2 hrs post dinner.     Rtc in 3 weeks.           Corina Russell, RN, BSN, [facility]   Certified Diabetes Care &   [facility]

## 2024-02-07 NOTE — PROGRESS NOTES
Diabetes Self-Management Education & Support    Presents for: Initial Assessment for new diagnosis    Type of Service: In Person Visit      ASSESSMENT:  Pt referred to [facility] for new diagnosis of Type 2 Diabetes. Admitted to hospital from  -  for hyperglycemia with mild DKA. Received some diabetes education while in-patient. Spouse is a nurse. Both presented for education today.     Given an Accu-Check meter. Self taught. Has been checking bg four times a day: fasting and before breakfast, lunch and dinner.     Discharged on 40 units Lantus daily and Novolog sliding scale based on pre-meal b unit for every 25 points starting at 140.     Not necessarily counting carbs but was advised by RD in hospital to keep carb count at 60-65 per meal.     BG LOG: readings are pre-meal and bedtime:   : 353/316/422/250  : 276/505/479/419  : 311/421/372/324  : 293/--/299/--  : 236/281/311/--  : 175/--/--/234  : 230      Patient's most recent   Lab Results   Component Value Date    A1C 16.5 2024     is not meeting goal of <7.0    Diabetes knowledge and skills assessment:   Patient is knowledgeable in diabetes management concepts related to: Needs new onset education.     Continue education with the following diabetes management concepts: Healthy Eating, Being Active, Monitoring, and Taking Medication    Based on learning assessment above, most appropriate setting for further diabetes education would be: Individual setting.      PLAN  - Lantus: increase dose to 45 units daily (takes in pm)  - Novolog: start a base dose of 6 units with B/L/D and add current sliding scale dose when necessary of 1 unit for every 25 points starting at 140.   - Check bg four times a day: before meals and 2 hrs after dinner.   - Sign up for [employer]  - Message [facility] in one week if fasting bg remains above 150.     Topics to cover at upcoming visits: Healthy Eating, Being Active, Monitoring, and Taking  "Medication    Follow-up:   Rtc to see CDCES in 3 weeks.   Has appt with Pcp in 2.5 weeks.     See Care Plan for co-developed, patient-state behavior change goals.  AVS provided for patient today.    Education Materials Provided:  [employer] Healthy Living with Diabetes Book and Hypoglycemia Signs and Symptoms      SUBJECTIVE/OBJECTIVE:  Presents for: Initial Assessment for new diagnosis  Accompanied by: Spouse (Almaz)  Diabetes education in the past 24mo: Yes (minimal while in the hospital)  Diabetes type: Type 2  Date of diagnosis: 01/24  Disease course: Improving  How confident are you filling out medical forms by yourself:: Not Assessed  Diabetes management related comments/concerns: Improving fatigue and bg levels.  Transportation concerns: No  Difficulty affording diabetes medication?: No  Difficulty affording diabetes testing supplies?: No  Other concerns:: None  Cultural Influences/Ethnic Background:  Not  or       Diabetes Symptoms & Complications:  Diabetes Related Symptoms: Fatigue  Weight trend: Stable  Symptom course: Stable  Disease course: Improving  Complications assessed today?: No    Patient Problem List and Family Medical History reviewed for relevant medical history, current medical status, and diabetes risk factors.    Vitals:  There were no vitals taken for this visit.  Estimated body mass index is 31.97 kg/m  as calculated from the following:    Height as of 1/30/24: 1.88 m (6' 2\").    Weight as of an earlier encounter on 2/7/24: 112.9 kg (249 lb).   Last 3 BP:   BP Readings from Last 3 Encounters:   02/07/24 102/60   02/01/24 132/89       History   Smoking Status    Every Day    Packs/day: 1.00    Types: Cigarettes   Smokeless Tobacco    Never       Labs:  Lab Results   Component Value Date    A1C 16.5 01/30/2024     Lab Results   Component Value Date     02/01/2024     Lab Results   Component Value Date    LDL  02/01/2024      Comment:      Cannot estimate LDL when " "triglyceride exceeds 400 mg/dL     02/01/2024     Direct Measure HDL   Date Value Ref Range Status   02/01/2024 25 (L) >=40 mg/dL Final   ]  GFR Estimate   Date Value Ref Range Status   02/01/2024 >90 >60 mL/min/1.73m2 Final     No results found for: \"GFRESTBLACK\"  Lab Results   Component Value Date    CR 0.85 02/01/2024     No results found for: \"MICROALBUMIN\"    Healthy Eating:  Healthy Eating Assessed Today: Yes  Cultural/Anabaptist diet restrictions?: No  Do you have any food allergies or intolerances?: Yes (mild intolerance to shellfish)  Meal planning/habits: Carb counting, Smaller portions  Who cooks/prepares meals for you?: Self, Spouse  Who purchases food in  your home?: Self, Spouse  How many times a week on average do you eat food made away from home (restaurant/take-out)?: 1    Breakfast: Omlette with english muffin with peanut butter and jam  Lunch: Leftovers or mixed green salad with a source of protein  Dinner: Pork roast with carrots and potatoes or steak with roasted vegetables or a poke bowl with rice  Snacks: beef sticks or pickled herring or nuts  Other: Beverages are diet. Coffee has a little cream  Beverages: Coffee, Water, Sports drinks (Gatorade Zero)  Has patient met with a dietitian in the past?: No    Being Active:  Being Active Assessed Today: Yes  Exercise:: Yes  Days per week of moderate to strenuous exercise (like a brisk walk): 7 (Walks dog 7 days a week. Some lifting involved with work.). Still quite fatigued but will plan on increasing intensity of exercise within the next few weeks.   On average, minutes per day of exercise at this level: 20  Exercise Minutes per Week: 140  Barrier to exercise: Other (Asthma and currently still feeling fatigued due to new dx of diabetes and recent Covid infection.)    Monitoring:  Monitoring Assessed Today: Yes  Did patient bring glucose meter to appointment? : No (brought log book).  Blood Glucose Meter: Accu-chek  Times checking blood sugar " at home (number): 4   Times checking blood sugar at home (per): Day  Blood glucose trend: Decreasing      Taking Medications:  Diabetes Medication(s)       Insulin       insulin aspart (NOVOLOG PEN) 100 UNIT/ML pen Inject 1-10 Units Subcutaneous 3 times daily (before meals) Correction Scale - HIGH INSULIN RESISTANCE DOSING   Do Not give Correction Insulin if Pre-Meal BG less than 140. For Pre-Meal  - 164 give 1 unit. For Pre-Meal  - 189 give 2 units. For Pre-Meal  - 214 give 3 units. For Pre-Meal  - 239 give 4 units. For Pre-Meal  - 264 give 5 units. For Pre-Meal  - 289 give 6 units. For Pre-Meal  - 314 give 7 units. For Pre-Meal  - 339 give 8 units. For Pre-Meal  - 364 give 9 units.  For Pre-Meal BG greater than or equal to 365 give 10 units To be given with prandial insulin, and based on pre-meal blood glucose. Administering insulin within 5 minutes of the start of the meal is ideal. Administer insulin no more than 30 minutes after the start of the meal, unless directed otherwise by provider. Notify provider if glucose greater than or equal to 350 mg/dL after administration of correction dose.     insulin glargine 100 UNIT/ML pen Inject 40 Units Subcutaneous every morning            Taking Medication Assessed Today: Yes  Current Treatments: Insulin Injections  Dose schedule: Pre-breakfast, Pre-lunch, Pre-dinner, At bedtime  Given by: Patient  Injection/Infusion sites: Abdomen  Problems taking diabetes medications regularly?: No  Diabetes medication side effects?: No    Problem Solving:  Problem Solving Assessed Today: No  Is the patient at risk for hypoglycemia?: Yes  Hypoglycemia Frequency: Never  Hypoglycemia Treatment: Glucose (tablets or gel), Juice  Medical ID: No    Reducing Risks:  Reducing Risks Assessed Today: No  Diabetes Risks: Family History  CAD Risks: Hypertension, Male sex, Obesity  Has dilated eye exam at least once a year?: No  Sees dentist  every 6 months?: No  Feet checked by healthcare provider in the last year?: No    Healthy Coping:  Healthy Coping Assessed Today: No  Emotional response to diabetes: Ready to learn  Informal Support system:: Spouse  Stage of change: PREPARATION (Decided to change - considering how)    Patient Activation Measure Survey Score:       No data to display                Care Plan and Education Provided:  See above.     Time Spent: 60 minutes  Encounter Type: Individual    Any diabetes medication dose changes were made via the CDE Protocol per the patient's primary care provider. A copy of this encounter was shared with the provider.    Corina Russell, RN, BSN, [facility]   Certified Diabetes Care &   [facility]

## 2024-02-07 NOTE — PROGRESS NOTES
Assessment & Plan       (Z09) Hospital discharge follow-up  Comment:   Plan: Comprehensive metabolic panel      (E11.00) Hyperosmolar hyperglycemic state (HHS) (H)  (primary encounter diagnosis)  Comment:   Plan: Albumin Random Urine Quantitative with Creat         Ratio, FOOT EXAM            (E11.9) New onset of type 2 diabetes mellitus in pediatric patient (H)  Comment:   Plan: FOOT EXAM, Comprehensive metabolic panel            discussed condition in detail; quite a bit of time was spent discussing the implications of the diagnosis, lifestyle factors that can significantly improve his health, goals of treatment, treatment options, etc. The need for thorough diabetes education was emphasized, and he was recommended to make an appointment with diabetic educator   The need for aggressive management and fairly close follow-up was also discussed. The importance of a goal HgA1c , low blood pressure readings, and an LDL below 100 were all reviewed in detail. Regular use of aspirin was also discussed.   with follow-up with me within a few weeks after starting treatment     (I10) Benign essential hypertension  Comment:   Plan: lisinopril (ZESTRIL) 40 MG tablet,         chlorthalidone (HYGROTON) 25 MG tablet,         Comprehensive metabolic panel    BP in adequate control. Discussed cares, low fat low salt  diet etc.   Check labs, call pt with results. Refill given. encouraged home BP monitoring. Follow up recheck in 6 months, sooner if problem.               (K21.9) Gastroesophageal reflux disease without esophagitis  Comment:   Plan: omeprazole-sodium bicarbonate (ZEGERID)         MG capsule            (J30.2) Seasonal allergic rhinitis, unspecified trigger  Comment:   Plan: : montelukast (SINGULAIR) 10 MG         tablet            (J45.41) Moderate persistent asthma with acute exacerbation  Comment: improving. Also recovered from Covid infection recently   Plan:  montelukast (SINGULAIR) 10 MG         tablet                     Check labs. refill sent.Cares and  treatment discussed.  follow up if problem   Patient expressed understanding and agreement with treatment plan. All patient's questions were answered, will let me know if has more later.  Medications: Rx's: Reviewed the potential side effects/complications of medications prescribed.         MED REC REQUIRED  Post Medication Reconciliation Status: discharge medications reconciled, continue medications without change    See Patient Instructions    Griselda Collins is a 43 year old, presenting for the following health issues:  Hospital F/U and Diabetes (Fasting )        2/7/2024    10:58 AM   Additional Questions   Roomed by Timmy BARBA   Accompanied by wife     [facility]   Hospital follow up       2/2/2024     1:32 PM   Post Discharge Outreach   Admission Date 1/30/2024   Reason for Admission Diabetes mellitus type 2 with hyperglycemia, uncontrolled and new diagnosis   Discharge Date 2/1/2024   Discharge Diagnosis Diabetes mellitus type 2 with hyperglycemia, uncontrolled and new diagnosis   How are you doing now that you are home? Okay; moving slower than usual.   How are your symptoms? (Red Flag symptoms escalate to triage hotline per guidelines) Improved   Do you feel your condition is stable enough to be safe at home until your provider visit? Yes   Does the patient have their discharge instructions?  Yes   Does the patient have questions regarding their discharge instructions?  No   Were you started on any new medications or were there changes to any of your previous medications?  Yes   Does the patient have all of their medications? Yes   Do you have questions regarding any of your medications?  No   Do you have all of your needed medical supplies or equipment (DME)?  (i.e. oxygen tank, CPAP, cane, etc.) Yes   Discharge follow-up appointment scheduled within 14 calendar days?  Yes   Discharge Follow Up Appointment Date 2/7/2024   Discharge Follow Up Appointment Scheduled  with? Primary Care Provider     Hospital Follow-up Visit:    Hospital/Nursing Home/IP Rehab Facility: [facility]  Date of Admission: 1/30  Date of Discharge: 2/1    Date of Admission:  1/30/2024  Date of Discharge:  2/1/2024  Discharging Provider: Farhan Mcpherson DO  Discharge Service: Hospitalist Service        Discharge Diagnoses  # Diabetes mellitus type 2 with hyperglycemia, uncontrolled and new diagnosis  -# Hyperglycemia with mild DKA with elevated anion gap         Was your hospitalization related to COVID-19? No   Problems taking medications regularly:  None  Medication changes since discharge: None  Problems adhering to non-medication therapy:  None    Summary of hospitalization:  [facility] discharge summary reviewed    New onset of dm diagnosed during this hospitalization Diagnostic Tests/Treatments reviewed.  Follow up needed:   Other Healthcare Providers Involved in Patient s Care: had appointment with diabetic educator          None  Update since discharge: improved.     Plan of care communicated with patient     Diabetes Follow-up    How often are you checking your blood sugar? A few times a week  What time of day are you checking your blood sugars (select all that apply)?  Before and after meals  Have you had any blood sugars above 200?  No  Have you had any blood sugars below 70?  No  What symptoms do you notice when your blood sugar is low?  None and as above hospital follow up   What concerns do you have today about your diabetes? None and Other: as above    Do you have any of these symptoms? (Select all that apply)  No numbness or tingling in feet.  No redness, sores or blisters on feet.  No complaints of excessive thirst.  No reports of blurry vision.  No significant changes to weight.        Hypertension Follow-up    Do you check your blood pressure regularly outside of the clinic? Yes   Are you following a low salt diet? Yes  Are your blood  pressures ever more than 140 on the top number (systolic) OR more   than 90 on the bottom number (diastolic), for example 140/90? No    BP Readings from Last 2 Encounters:   02/28/24 110/68   02/07/24 102/60     Hemoglobin A1C (%)   Date Value   02/28/2024 12.1 (H)   01/30/2024 16.5 (H)     LDL Cholesterol Calculated (no units)   Date Value   02/01/2024      Comment:     Cannot estimate LDL when triglyceride exceeds 400 mg/dL     LDL Cholesterol Direct (mg/dL)   Date Value   02/01/2024 119 (H)           Review of Systems  Constitutional, HEENT, cardiovascular, pulmonary, GI, , musculoskeletal, neuro, skin, endocrine and psych systems are negative, except as otherwise noted.      Objective    /60   Pulse 94   Temp 97.7  F (36.5  C) (Tympanic)   Resp 16   Wt 112.9 kg (249 lb)   SpO2 94%   BMI 31.97 kg/m    Body mass index is 31.97 kg/m .  Physical Exam   GENERAL: alert and no distress  EYES: Eyes grossly normal to inspection,  HENT: ear canals and TM's normal, nose and mouth without ulcers or lesions  NECK: no adenopathy, no asymmetry, masses, or scars, and thyroid normal to palpation  RESP: lungs clear to auscultation - no rales, rhonchi or wheezes  CV: regular rate and rhythm, normal S1 S2, no S3 or S4, no peripheral edema  ABDOMEN: soft, nontender, no hepatosplenomegaly, no masses and bowel sounds normal  MS: no edema  SKIN: no suspicious lesions or rashes  NEURO: Normal strength and tone, mentation intact and speech normal  PSYCH: mentation appears normal, affect normal        Signed Electronically by: Yanet Jama MD

## 2024-02-08 DIAGNOSIS — J30.2 SEASONAL ALLERGIC RHINITIS, UNSPECIFIED TRIGGER: ICD-10-CM

## 2024-02-08 DIAGNOSIS — J45.41 MODERATE PERSISTENT ASTHMA WITH ACUTE EXACERBATION: ICD-10-CM

## 2024-02-08 LAB
ALBUMIN SERPL BCG-MCNC: 4 G/DL (ref 3.5–5.2)
ALP SERPL-CCNC: 79 U/L (ref 40–150)
ALT SERPL W P-5'-P-CCNC: 21 U/L (ref 0–70)
ANION GAP SERPL CALCULATED.3IONS-SCNC: 10 MMOL/L (ref 7–15)
AST SERPL W P-5'-P-CCNC: 16 U/L (ref 0–45)
BILIRUB SERPL-MCNC: 0.3 MG/DL
BUN SERPL-MCNC: 18.5 MG/DL (ref 6–20)
CALCIUM SERPL-MCNC: 9.4 MG/DL (ref 8.6–10)
CHLORIDE SERPL-SCNC: 99 MMOL/L (ref 98–107)
CREAT SERPL-MCNC: 0.92 MG/DL (ref 0.67–1.17)
CREAT UR-MCNC: 50.8 MG/DL
DEPRECATED HCO3 PLAS-SCNC: 27 MMOL/L (ref 22–29)
EGFRCR SERPLBLD CKD-EPI 2021: >90 ML/MIN/1.73M2
GLUCOSE SERPL-MCNC: 230 MG/DL (ref 70–99)
MICROALBUMIN UR-MCNC: <12 MG/L
MICROALBUMIN/CREAT UR: NORMAL MG/G{CREAT}
POTASSIUM SERPL-SCNC: 4.1 MMOL/L (ref 3.4–5.3)
PROT SERPL-MCNC: 7 G/DL (ref 6.4–8.3)
SODIUM SERPL-SCNC: 136 MMOL/L (ref 135–145)

## 2024-02-08 RX ORDER — MONTELUKAST SODIUM 10 MG/1
10 TABLET ORAL AT BEDTIME
Qty: 30 TABLET | Refills: 1 | OUTPATIENT
Start: 2024-02-08

## 2024-02-08 NOTE — TELEPHONE ENCOUNTER
Pharmacy requested duplicate. Medication refused.     Script sent to the pharmacy yesterday (2/7/24) for 30 tablets with 1 additional refill on file. Patient is scheduled for a follow-up appointment on 2/28/24: further refills to be discussed during this upcoming appointment.     Mady Tao RN

## 2024-02-27 ENCOUNTER — TRANSFERRED RECORDS (OUTPATIENT)
Dept: MULTI SPECIALTY CLINIC | Facility: CLINIC | Age: 44
End: 2024-02-27
Payer: COMMERCIAL

## 2024-02-27 LAB
RETINOPATHY: NEGATIVE
RETINOPATHY: NORMAL

## 2024-02-28 ENCOUNTER — OFFICE VISIT (OUTPATIENT)
Dept: FAMILY MEDICINE | Facility: CLINIC | Age: 44
End: 2024-02-28
Payer: COMMERCIAL

## 2024-02-28 ENCOUNTER — ALLIED HEALTH/NURSE VISIT (OUTPATIENT)
Dept: EDUCATION SERVICES | Facility: CLINIC | Age: 44
End: 2024-02-28
Payer: COMMERCIAL

## 2024-02-28 VITALS
WEIGHT: 250 LBS | SYSTOLIC BLOOD PRESSURE: 110 MMHG | OXYGEN SATURATION: 95 % | RESPIRATION RATE: 19 BRPM | TEMPERATURE: 98 F | BODY MASS INDEX: 33.86 KG/M2 | DIASTOLIC BLOOD PRESSURE: 68 MMHG | HEIGHT: 72 IN | HEART RATE: 102 BPM

## 2024-02-28 DIAGNOSIS — M54.50 BILATERAL LOW BACK PAIN WITHOUT SCIATICA, UNSPECIFIED CHRONICITY: ICD-10-CM

## 2024-02-28 DIAGNOSIS — E11.9 TYPE 2 DIABETES MELLITUS (H): Primary | ICD-10-CM

## 2024-02-28 DIAGNOSIS — Z00.00 ROUTINE GENERAL MEDICAL EXAMINATION AT A HEALTH CARE FACILITY: ICD-10-CM

## 2024-02-28 DIAGNOSIS — Z11.4 SCREENING FOR HIV (HUMAN IMMUNODEFICIENCY VIRUS): ICD-10-CM

## 2024-02-28 DIAGNOSIS — E78.5 HYPERLIPIDEMIA LDL GOAL <100: ICD-10-CM

## 2024-02-28 DIAGNOSIS — J45.41 MODERATE PERSISTENT ASTHMA WITH ACUTE EXACERBATION: ICD-10-CM

## 2024-02-28 DIAGNOSIS — E11.69 TYPE 2 DIABETES MELLITUS WITH OTHER SPECIFIED COMPLICATION, WITH LONG-TERM CURRENT USE OF INSULIN (H): Primary | ICD-10-CM

## 2024-02-28 DIAGNOSIS — I10 BENIGN ESSENTIAL HYPERTENSION: ICD-10-CM

## 2024-02-28 DIAGNOSIS — Z79.4 TYPE 2 DIABETES MELLITUS WITH OTHER SPECIFIED COMPLICATION, WITH LONG-TERM CURRENT USE OF INSULIN (H): Primary | ICD-10-CM

## 2024-02-28 DIAGNOSIS — Z11.59 NEED FOR HEPATITIS C SCREENING TEST: ICD-10-CM

## 2024-02-28 DIAGNOSIS — Z12.11 SCREEN FOR COLON CANCER: ICD-10-CM

## 2024-02-28 LAB — HBA1C MFR BLD: 12.1 % (ref 0–5.6)

## 2024-02-28 PROCEDURE — 99396 PREV VISIT EST AGE 40-64: CPT | Mod: 25 | Performed by: FAMILY MEDICINE

## 2024-02-28 PROCEDURE — 90677 PCV20 VACCINE IM: CPT | Performed by: FAMILY MEDICINE

## 2024-02-28 PROCEDURE — 80053 COMPREHEN METABOLIC PANEL: CPT | Performed by: FAMILY MEDICINE

## 2024-02-28 PROCEDURE — 99214 OFFICE O/P EST MOD 30 MIN: CPT | Mod: 25 | Performed by: FAMILY MEDICINE

## 2024-02-28 PROCEDURE — 90472 IMMUNIZATION ADMIN EACH ADD: CPT | Performed by: FAMILY MEDICINE

## 2024-02-28 PROCEDURE — 36415 COLL VENOUS BLD VENIPUNCTURE: CPT | Performed by: FAMILY MEDICINE

## 2024-02-28 PROCEDURE — 87389 HIV-1 AG W/HIV-1&-2 AB AG IA: CPT | Performed by: FAMILY MEDICINE

## 2024-02-28 PROCEDURE — G0108 DIAB MANAGE TRN  PER INDIV: HCPCS | Mod: AE

## 2024-02-28 PROCEDURE — 90471 IMMUNIZATION ADMIN: CPT | Performed by: FAMILY MEDICINE

## 2024-02-28 PROCEDURE — 86803 HEPATITIS C AB TEST: CPT | Performed by: FAMILY MEDICINE

## 2024-02-28 PROCEDURE — 83036 HEMOGLOBIN GLYCOSYLATED A1C: CPT | Performed by: FAMILY MEDICINE

## 2024-02-28 PROCEDURE — 90715 TDAP VACCINE 7 YRS/> IM: CPT | Performed by: FAMILY MEDICINE

## 2024-02-28 RX ORDER — PEN NEEDLE, DIABETIC 31 GX5/16"
1 NEEDLE, DISPOSABLE MISCELLANEOUS 4 TIMES DAILY
Qty: 200 EACH | Refills: 3 | Status: SHIPPED | OUTPATIENT
Start: 2024-02-28

## 2024-02-28 RX ORDER — LANCETS
EACH MISCELLANEOUS
Qty: 200 EACH | Refills: 3 | Status: SHIPPED | OUTPATIENT
Start: 2024-02-28

## 2024-02-28 RX ORDER — ALBUTEROL SULFATE 90 UG/1
2 AEROSOL, METERED RESPIRATORY (INHALATION) EVERY 4 HOURS PRN
Qty: 18 G | Refills: 1 | Status: SHIPPED | OUTPATIENT
Start: 2024-02-28

## 2024-02-28 RX ORDER — INSULIN GLARGINE 100 [IU]/ML
INJECTION, SOLUTION SUBCUTANEOUS
Qty: 30 ML | Refills: 3 | Status: SHIPPED | OUTPATIENT
Start: 2024-02-28 | End: 2024-02-29

## 2024-02-28 RX ORDER — INSULIN ASPART 100 [IU]/ML
INJECTION, SOLUTION INTRAVENOUS; SUBCUTANEOUS
Qty: 30 ML | Refills: 3 | Status: SHIPPED | OUTPATIENT
Start: 2024-02-28

## 2024-02-28 RX ORDER — FLUTICASONE PROPIONATE AND SALMETEROL 100; 50 UG/1; UG/1
1 POWDER RESPIRATORY (INHALATION) 2 TIMES DAILY
Qty: 1 EACH | Refills: 1 | Status: SHIPPED | OUTPATIENT
Start: 2024-02-28 | End: 2024-02-28

## 2024-02-28 RX ORDER — FLUTICASONE PROPIONATE AND SALMETEROL 250; 50 UG/1; UG/1
1 POWDER RESPIRATORY (INHALATION) 2 TIMES DAILY
Qty: 60 EACH | Refills: 3 | Status: SHIPPED | OUTPATIENT
Start: 2024-02-28 | End: 2024-09-21

## 2024-02-28 SDOH — HEALTH STABILITY: PHYSICAL HEALTH: ON AVERAGE, HOW MANY DAYS PER WEEK DO YOU ENGAGE IN MODERATE TO STRENUOUS EXERCISE (LIKE A BRISK WALK)?: 3 DAYS

## 2024-02-28 ASSESSMENT — ASTHMA QUESTIONNAIRES
QUESTION_2 LAST FOUR WEEKS HOW OFTEN HAVE YOU HAD SHORTNESS OF BREATH: ONCE OR TWICE A WEEK
EMERGENCY_ROOM_LAST_YEAR_TOTAL: THREE OR MORE
QUESTION_3 LAST FOUR WEEKS HOW OFTEN DID YOUR ASTHMA SYMPTOMS (WHEEZING, COUGHING, SHORTNESS OF BREATH, CHEST TIGHTNESS OR PAIN) WAKE YOU UP AT NIGHT OR EARLIER THAN USUAL IN THE MORNING: NOT AT ALL
QUESTION_1 LAST FOUR WEEKS HOW MUCH OF THE TIME DID YOUR ASTHMA KEEP YOU FROM GETTING AS MUCH DONE AT WORK, SCHOOL OR AT HOME: A LITTLE OF THE TIME
HOSPITALIZATION_OVERNIGHT_LAST_YEAR_TOTAL: ONE
QUESTION_4 LAST FOUR WEEKS HOW OFTEN HAVE YOU USED YOUR RESCUE INHALER OR NEBULIZER MEDICATION (SUCH AS ALBUTEROL): ONE OR TWO TIMES PER DAY
QUESTION_5 LAST FOUR WEEKS HOW WOULD YOU RATE YOUR ASTHMA CONTROL: WELL CONTROLLED
ACT_TOTALSCORE: 19
ACT_TOTALSCORE: 19

## 2024-02-28 ASSESSMENT — PAIN SCALES - GENERAL: PAINLEVEL: NO PAIN (0)

## 2024-02-28 ASSESSMENT — SOCIAL DETERMINANTS OF HEALTH (SDOH): HOW OFTEN DO YOU GET TOGETHER WITH FRIENDS OR RELATIVES?: MORE THAN THREE TIMES A WEEK

## 2024-02-28 NOTE — LETTER
2024         RE: Alexey Arriaga  [address]  [place] [address]        Dear Colleague,    Thank you for referring your patient, Alexey Arriaga, to the [facility]. Please see a copy of my visit note below.    Diabetes Self-Management Education & Support    Presents for:      Type of Service: In Person Visit      ASSESSMENT:  Pt was initially referred to [facility], on 24, for new diagnosis of Type 2 Diabetes. Admitted to hospital from  -  for hyperglycemia with mild DKA. Received some diabetes education while in-patient. Patient was given an given an Accu-Check meter at that time. Self taught bg checks, meter use. Has been checking bg four times a day: fasting and before breakfast, lunch and dinner. Patient was discharged on 40 units Lantus daily and Novolog sliding scale based on pre-meal b unit for every 25 points starting at 140. Was not asked to count carbs, per se, but was encouraged by RD in hospital to keep carb count at 60-65 per meal. Patient returns today for follow-up.     BG LO/27: F: 143, Pre L: 128  : F: 125, Pre L: 130  : F: 144, Pre D: 177  : F: 138, Pre L: 119, Pre D: 168, 2 hrs post D: 186  : F: 131, --, Pre D: 135, 2 hrs post D: 201      Patient's most recent   Lab Results   Component Value Date    A1C 12.1 2024     is not meeting goal of <7.0    Diabetes knowledge and skills assessment:   Patient is knowledgeable in diabetes management concepts related to: Monitoring and Taking Medication    Continue education with the following diabetes management concepts: Healthy Eating, Being Active, Monitoring, and Taking Medication    Based on learning assessment above, most appropriate setting for further diabetes education would be: Individual setting.      PLAN  - Increase Lantus from 45 to 48 units daily.  - Increase dinner dose Novolog from 6 to 7 units. Keep breakfast and lunch dose at 6  "units.  - Continue use of sliding scale, when necessary: 1 unit for every 25 points (for pre-meal bg) starting at 140.    Topics to cover at upcoming visits: Healthy Eating, Being Active, Monitoring, and Taking Medication    Follow-up: Rtc in 6 weeks.     See Care Plan for co-developed, patient-state behavior change goals.  AVS provided for patient today.    Education Materials Provided:  No new materials provided today      SUBJECTIVE/OBJECTIVE:  Presents for: Follow-up  Accompanied by: Self  Diabetes education in the past 24mo: Yes (minimal while in the hospital)  Diabetes type: Type 2  Date of diagnosis: 01/24  Disease course: Improving  How confident are you filling out medical forms by yourself:: Not Assessed  Diabetes management related comments/concerns: Improving fatigue and bg levels.  Transportation concerns: No  Difficulty affording diabetes medication?: No  Difficulty affording diabetes testing supplies?: No  Other concerns:: None  Cultural Influences/Ethnic Background:  Not  or         Diabetes Symptoms & Complications:  Diabetes Related Symptoms: Fatigue  Weight trend: Stable  Symptom course: Stable  Disease course: Improving  Complications assessed today?: No      Patient Problem List and Family Medical History reviewed for relevant medical history, current medical status, and diabetes risk factors.    Vitals:  There were no vitals taken for this visit.  Estimated body mass index is 34.05 kg/m  as calculated from the following:    Height as of an earlier encounter on 2/28/24: 1.825 m (5' 11.85\").    Weight as of an earlier encounter on 2/28/24: 113.4 kg (250 lb).   Last 3 BP:   BP Readings from Last 3 Encounters:   02/28/24 110/68   02/07/24 102/60   02/01/24 132/89       History   Smoking Status     Every Day     Packs/day: 1.00     Types: Cigarettes   Smokeless Tobacco     Never       Labs:  Lab Results   Component Value Date    A1C 12.1 02/28/2024     Lab Results   Component Value " "Date     02/07/2024     02/01/2024     Lab Results   Component Value Date    LDL  02/01/2024      Comment:      Cannot estimate LDL when triglyceride exceeds 400 mg/dL     02/01/2024     Direct Measure HDL   Date Value Ref Range Status   02/01/2024 25 (L) >=40 mg/dL Final   ]  GFR Estimate   Date Value Ref Range Status   02/07/2024 >90 >60 mL/min/1.73m2 Final     No results found for: \"GFRESTBLACK\"  Lab Results   Component Value Date    CR 0.92 02/07/2024     No results found for: \"MICROALBUMIN\"    Healthy Eating:  Breakfast: Eggs with steak and eng muffin  Lunch: Leftovers or mixed green salad with a source of protein. Sometimes skips.  Dinner: Steak, Chkn, Pork, Fish with vegetable and rice,pasta or potatoes.   Snacks: beef sticks or pickled herring or nuts  Other: Beverages are diet. Coffee has a little cream  Beverages: Coffee, Water, Sports drinks (Gatorade Zero)   Has decreased porion size of rice, bread, pasta, potatoes.     Being Active:  Walks dog daily for at least 30 minutes at a time.      Monitoring:  See above.       Taking Medications:  Diabetes Medication(s)       Insulin       insulin aspart (NOVOLOG FLEXPEN) 100 UNIT/ML pen Novolog Flexpen: 6 units with breakfast, 6 units with lunch, 7 units with dinner.     insulin aspart (NOVOLOG PEN) 100 UNIT/ML pen Inject 1-10 Units Subcutaneous 3 times daily (before meals) Correction Scale - HIGH INSULIN RESISTANCE DOSING   Do Not give Correction Insulin if Pre-Meal BG less than 140. For Pre-Meal  - 164 give 1 unit. For Pre-Meal  - 189 give 2 units. For Pre-Meal  - 214 give 3 units. For Pre-Meal  - 239 give 4 units. For Pre-Meal  - 264 give 5 units. For Pre-Meal  - 289 give 6 units. For Pre-Meal  - 314 give 7 units. For Pre-Meal  - 339 give 8 units. For Pre-Meal  - 364 give 9 units.  For Pre-Meal BG greater than or equal to 365 give 10 units To be given with prandial insulin, and based " on pre-meal blood glucose. Administering insulin within 5 minutes of the start of the meal is ideal. Administer insulin no more than 30 minutes after the start of the meal, unless directed otherwise by provider. Notify provider if glucose greater than or equal to 350 mg/dL after administration of correction dose.     insulin glargine (LANTUS VIAL) 100 UNIT/ML vial Take 48 units daily.     insulin glargine 100 UNIT/ML pen Inject 40 Units Subcutaneous every morning             Patient Activation Measure Survey Score:       No data to display                Time Spent: 45 minutes  Encounter Type: Individual    Any diabetes medication dose changes were made via the CDE Protocol per the patient's primary care provider. A copy of this encounter was shared with the provider.    Corina Russell RN, BSN, [facility]   Certified Diabetes Care &   [facility]

## 2024-02-28 NOTE — PROGRESS NOTES
Preventive Care Visit  [facility] MD, Family Medicine  Feb 28, 2024    Assessment & Plan     (Z00.00) Routine general medical examination at a health care facility  (primary encounter diagnosis)  Comment:   Plan: Pneumococcal 20 Valent Conjugate (Prevnar 20)              (Z12.11) Screen for colon cancer  Comment:   Plan: Colonoscopy Screening  Referral            (Z11.4) Screening for HIV (human immunodeficiency virus)  Comment:   Plan: HIV Antigen Antibody Combo            (Z11.59) Need for hepatitis C screening test  Comment:   Plan: Hepatitis C Screen Reflex to HCV RNA Quant and         Genotype            (E11.69,  Z79.4) Type 2 diabetes mellitus with other specified complication, with long-term current use of insulin (H)  Comment:   Plan: insulin pen needle (32G X 4 MM) 32G X 4 MM         miscellaneous, Hemoglobin A1c, Comprehensive         metabolic panel            (R73.9) Hyperglycemia  Comment:   Plan: blood glucose (NO BRAND SPECIFIED) test strip            (J45.41) Moderate persistent asthma with acute exacerbation  Comment:   Plan: albuterol (PROAIR HFA/PROVENTIL HFA/VENTOLIN         HFA) 108 (90 Base) MCG/ACT inhaler,         fluticasone-salmeterol (ADVAIR) 250-50 MCG/ACT         inhaler, DISCONTINUED: fluticasone-salmeterol         (ADVAIR) 100-50 MCG/ACT inhaler            Improved and stable . He needs albuterol at home mostly bc he is allergic to cat and wife does not give up her cat.discussed risk recaution prevention etc.      Consider seeing allergist,  if allergies get any worse     (I10) Benign essential hypertension  Comment:   Plan: BP in adequate control. Discussed cares, low fat low salt diet etc. Check labs,.   encouraged home BP monitoring. Follow up recheck in 6 months, sooner if problem.       (M54.50) Bilateral low back pain without sciatica, unspecified chronicity  Comment: hx of  L-5- S1 fusion, 2013, stable,  Plan:     "discussed back and cares and symptomatic treatment including  adequate pain control, heat,  stretches etc. takes OTC med's as needed asked him to avid nsaids now.    he will do follow up if  problem.       Check labs. refill sent.Cares and  treatment discussed follow. up if problem   Patient expressed understanding and agreement with treatment plan. All patient's questions were answered, will let me know if has more later.  Medications: Rx's: Reviewed the potential side effects/complications of medications prescribed.       Nicotine/Tobacco Cessation  He reports that he has been smoking cigarettes. He has been smoking an average of 1 pack per day. He has never used smokeless tobacco.  Nicotine/Tobacco Cessation Plan  Information offered: Patient not interested at this time  Pharmacotherapies : Nicotine patch has been given previously but has not tried yet, he will consider later       BMI  Estimated body mass index is 34.05 kg/m  as calculated from the following:    Height as of this encounter: 1.825 m (5' 11.85\").    Weight as of this encounter: 113.4 kg (250 lb).   Weight management plan noted, stable and monitoring    Counseling  Appropriate preventive services were discussed with this patient, including applicable screening as appropriate for fall prevention, nutrition, physical activity, Tobacco-use cessation, weight loss and cognition.  Checklist reviewing preventive services available has been given to the patient.  Reviewed patient's diet, addressing concerns and/or questions.   He is at risk for lack of exercise and has been provided with information to increase physical activity for the benefit of his well-being.   The patient was instructed to see the dentist every 6 months.   He is at risk for psychosocial distress and has been provided with information to reduce risk.       Regular exercise  See Patient Instructions    Griselda   Dennis is a 43 year old, presenting for the following:  Physical " (Requesting rx for tessalon and needs renewal on diabetic supplies )        2/28/2024    11:43 AM   Additional Questions   Roomed by Sylvia CASTILLO        Via the Health Maintenance questionnaire, the patient has reported the following services have been completed -Eye Exam, this information has been sent to the abstraction team.  Health Care Directive  Patient does not have a Health Care Directive or Living Will:     HPI      Diabetes Follow-up    How often are you checking your blood sugar? Yes , average fasting 120- 140   What concerns do you have today about your diabetes? None and Other: wast follow up labs has appointment with diabetic educator today but he is eating better and physically active    Do you have any of these symptoms? (Select all that apply)  No numbness or tingling in feet.  No redness, sores or blisters on feet.  No complaints of excessive thirst.  No reports of blurry vision.  No significant changes to weight.        Hyperlipidemia Follow-Up    Are you regularly taking any medication or supplement to lower your cholesterol?   No  but will consider adding later  Are you having muscle aches or other side effects that you think could be caused by your cholesterol lowering medication? NA     Hypertension Follow-up    Do you check your blood pressure regularly outside of the clinic? Not but his BP is ok when he checks   Are you following a low salt diet? Yes  Are your blood pressures ever more than 140 on the top number (systolic) OR more   than 90 on the bottom number (diastolic), for example 140/90? No          BP Readings from Last 2 Encounters:   02/28/24 110/68   02/07/24 102/60     Hemoglobin A1C (%)   Date Value   02/28/2024 12.1 (H)   01/30/2024 16.5 (H)     LDL Cholesterol Calculated (no units)   Date Value   02/01/2024      Comment:     Cannot estimate LDL when triglyceride exceeds 400 mg/dL     LDL Cholesterol Direct (mg/dL)   Date Value   02/01/2024 119 (H)             Asthma  Follow-Up    Was ACT completed today?  Yes          Improved aftre adding singulair and also using advair. and stable .   He needs albuterol at home mostly bc he is allergic to cat and wife does not give up her cat.      2/28/2024    11:24 AM   ACT Total Scores   ACT TOTAL SCORE (Goal Greater than or Equal to 20) 19   In the past 12 months, how many times did you visit the emergency room for your asthma without being admitted to the hospital? 3   In the past 12 months, how many times were you hospitalized overnight because of your asthma? 1       How many days per week do you miss taking your asthma controller medication?  0  Please describe any recent triggers for your asthma: animal dander and exercise or sports  Have you had any Emergency Room Visits, Urgent Care Visits, or Hospital Admissions since your last office visit?  No      2/28/2024   General Health   How would you rate your overall physical health? Good   Feel stress (tense, anxious, or unable to sleep) Only a little   (!) STRESS CONCERN      2/28/2024   Nutrition   Three or more servings of calcium each day? Yes   Diet: Diabetic   How many servings of fruit and vegetables per day? (!) 2-3   How many sweetened beverages each day? (!) 3         2/28/2024   Exercise   Days per week of moderate/strenous exercise 3 days         2/28/2024   Social Factors   Frequency of gathering with friends or relatives More than three times a week   Worry food won't last until get money to buy more No   Food not last or not have enough money for food? No   Do you have housing?  Yes   Are you worried about losing your housing? No   Lack of transportation? No   Unable to get utilities (heat,electricity)? No         2/28/2024   Dental   Dentist two times every year? (!) NO         2/28/2024   TB Screening   Were you born outside of [place]?  (!) YES             Today's PHQ-2 Score:       2/7/2024    10:43 AM   PHQ-2 ( 1999 [employer])   Q1: Little interest or pleasure in doing things  0   Q2: Feeling down, depressed or hopeless 0   PHQ-2 Score 0   Q1: Little interest or pleasure in doing things Not at all   Q2: Feeling down, depressed or hopeless Not at all   PHQ-2 Score 0         2/28/2024   Substance Use   If I could quit smoking, I would Completely disagree   I want to quit somking, worry about health affects Completely disagree   Willing to make a plan to quit smoking Completely disagree   Willing to cut down before quitting Completely disagree   Alcohol more than 3/day or more than 7/wk No   Do you use any other substances recreationally? No     Social History     Tobacco Use    Smoking status: Every Day     Packs/day: 1     Types: Cigarettes    Smokeless tobacco: Never             2/28/2024   One time HIV Screening   Previous HIV test? No         2/28/2024   STI Screening   New sexual partner(s) since last STI/HIV test? No   ASCVD Risk   The 10-year ASCVD risk score (Letty BUTTS, et al., 2019) is: 26%    Values used to calculate the score:      Age: 43 years      Sex: Male      Is Non- : No      Diabetic: Yes      Tobacco smoker: Yes      Systolic Blood Pressure: 110 mmHg      Is BP treated: Yes      HDL Cholesterol: 25 mg/dL      Total Cholesterol: 234 mg/dL        2/28/2024   Contraception/Family Planning   Questions about contraception or family planning No        Reviewed and updated as needed this visit by Provider                    BP Readings from Last 3 Encounters:   02/28/24 110/68   02/07/24 102/60   02/01/24 132/89    Wt Readings from Last 3 Encounters:   02/28/24 113.4 kg (250 lb)   02/07/24 112.9 kg (249 lb)   01/30/24 108.9 kg (240 lb)           Review of Systems  CONSTITUTIONAL: NEGATIVE for fever, chills, change in weight  INTEGUMENTARY/SKIN: NEGATIVE for worrisome rashes, moles or lesions  EYES: NEGATIVE for vision changes or irritation  ENT/MOUTH: NEGATIVE for ear, mouth and throat problems  RESP: NEGATIVE for significant cough or SOB, he  "has improved on med's   RESP:Hx asthma  CV: NEGATIVE for chest pain, palpitations or peripheral edema  GI: NEGATIVE for nausea, abdominal pain, heartburn, or change in bowel habits  : NEGATIVE for frequency, dysuria, or hematuria  MUSCULOSKELETAL: NEGATIVE for significant arthralgias or myalgia  NEURO: NEGATIVE for weakness, dizziness or paresthesias  ENDOCRINE: NEGATIVE for temperature intolerance, skin/hair changes  HEME: NEGATIVE for bleeding problems  PSYCHIATRIC: NEGATIVE for changes in mood or affect     Objective    Exam  /68 (BP Location: Left arm, Patient Position: Sitting, Cuff Size: Adult Regular)   Pulse 102   Temp 98  F (36.7  C) (Tympanic)   Resp 19   Ht 1.825 m (5' 11.85\")   Wt 113.4 kg (250 lb)   SpO2 95%   BMI 34.05 kg/m     Estimated body mass index is 34.05 kg/m  as calculated from the following:    Height as of this encounter: 1.825 m (5' 11.85\").    Weight as of this encounter: 113.4 kg (250 lb).    Physical Exam  GENERAL: alert and no distress  EYES: Eyes grossly normal to inspection, and conjunctivae and sclerae normal  HENT: ear canals and TM's normal, nose and mouth without ulcers or lesions  NECK: no adenopathy, no asymmetry, masses, or scars  RESP: lungs clear to auscultation - no rales, rhonchi or wheezes  CV: regular rate and rhythm, normal S1 S2, no S3 or S4, no murmur, click or rub, no peripheral edema  ABDOMEN: soft, nontender, no hepatosplenomegaly, no masses and bowel sounds normal   (male): pt declined   MS: no gross musculoskeletal defects noted, no edema  SKIN: no suspicious lesions or rashes  NEURO: Normal strength and tone, mentation intact and speech normal  PSYCH: mentation appears normal, affect normal  Foot exam : No lesion , normal sensation by monofilament. Normal peripheral pulses  .       Signed Electronically by: Ynaet Jama MD    "

## 2024-02-29 ENCOUNTER — TELEPHONE (OUTPATIENT)
Dept: EDUCATION SERVICES | Facility: CLINIC | Age: 44
End: 2024-02-29
Payer: COMMERCIAL

## 2024-02-29 DIAGNOSIS — E11.9 TYPE 2 DIABETES MELLITUS (H): Primary | ICD-10-CM

## 2024-02-29 LAB
ALBUMIN SERPL BCG-MCNC: 4.7 G/DL (ref 3.5–5.2)
ALP SERPL-CCNC: 60 U/L (ref 40–150)
ALT SERPL W P-5'-P-CCNC: 19 U/L (ref 0–70)
ANION GAP SERPL CALCULATED.3IONS-SCNC: 15 MMOL/L (ref 7–15)
AST SERPL W P-5'-P-CCNC: 14 U/L (ref 0–45)
BILIRUB SERPL-MCNC: 0.4 MG/DL
BUN SERPL-MCNC: 22 MG/DL (ref 6–20)
CALCIUM SERPL-MCNC: 9.7 MG/DL (ref 8.6–10)
CHLORIDE SERPL-SCNC: 99 MMOL/L (ref 98–107)
CREAT SERPL-MCNC: 0.88 MG/DL (ref 0.67–1.17)
DEPRECATED HCO3 PLAS-SCNC: 26 MMOL/L (ref 22–29)
EGFRCR SERPLBLD CKD-EPI 2021: >90 ML/MIN/1.73M2
GLUCOSE SERPL-MCNC: 139 MG/DL (ref 70–99)
HCV AB SERPL QL IA: NONREACTIVE
HIV 1+2 AB+HIV1 P24 AG SERPL QL IA: NONREACTIVE
POTASSIUM SERPL-SCNC: 4 MMOL/L (ref 3.4–5.3)
PROT SERPL-MCNC: 7.4 G/DL (ref 6.4–8.3)
SODIUM SERPL-SCNC: 140 MMOL/L (ref 135–145)

## 2024-02-29 RX ORDER — INSULIN GLARGINE 100 [IU]/ML
48 INJECTION, SOLUTION SUBCUTANEOUS EVERY MORNING
Qty: 15 ML | Refills: 3 | Status: SHIPPED | OUTPATIENT
Start: 2024-02-29 | End: 2024-07-12

## 2024-02-29 NOTE — PROGRESS NOTES
Diabetes Self-Management Education & Support    Presents for:      Type of Service: In Person Visit      ASSESSMENT:  Pt was initially referred to [facility], on 24, for new diagnosis of Type 2 Diabetes. Admitted to hospital from  -  for hyperglycemia with mild DKA. Received some diabetes education while in-patient. Patient was given an given an Accu-Check meter at that time. Self taught bg checks, meter use. Has been checking bg four times a day: fasting and before breakfast, lunch and dinner. Patient was discharged on 40 units Lantus daily and Novolog sliding scale based on pre-meal b unit for every 25 points starting at 140. Was not asked to count carbs, per se, but was encouraged by RD in hospital to keep carb count at 60-65 per meal. Patient returns today for follow-up.     BG LO/27: F: 143, Pre L: 128  : F: 125, Pre L: 130  : F: 144, Pre D: 177  : F: 138, Pre L: 119, Pre D: 168, 2 hrs post D: 186  : F: 131, --, Pre D: 135, 2 hrs post D: 201      Patient's most recent   Lab Results   Component Value Date    A1C 12.1 2024     is not meeting goal of <7.0    Diabetes knowledge and skills assessment:   Patient is knowledgeable in diabetes management concepts related to: Monitoring and Taking Medication    Continue education with the following diabetes management concepts: Healthy Eating, Being Active, Monitoring, and Taking Medication    Based on learning assessment above, most appropriate setting for further diabetes education would be: Individual setting.      PLAN  - Increase Lantus from 45 to 48 units daily.  - Increase dinner dose Novolog from 6 to 7 units. Keep breakfast and lunch dose at 6 units.  - Continue use of sliding scale, when necessary: 1 unit for every 25 points (for pre-meal bg) starting at 140.    Topics to cover at upcoming visits: Healthy Eating, Being Active, Monitoring, and Taking Medication    Follow-up: Rtc in 6 weeks.     See Care Plan for  "co-developed, patient-state behavior change goals.  AVS provided for patient today.    Education Materials Provided:  No new materials provided today      SUBJECTIVE/OBJECTIVE:  Presents for: Follow-up  Accompanied by: Self  Diabetes education in the past 24mo: Yes (minimal while in the hospital)  Diabetes type: Type 2  Date of diagnosis: 01/24  Disease course: Improving  How confident are you filling out medical forms by yourself:: Not Assessed  Diabetes management related comments/concerns: Improving fatigue and bg levels.  Transportation concerns: No  Difficulty affording diabetes medication?: No  Difficulty affording diabetes testing supplies?: No  Other concerns:: None  Cultural Influences/Ethnic Background:  Not  or         Diabetes Symptoms & Complications:  Diabetes Related Symptoms: Fatigue  Weight trend: Stable  Symptom course: Stable  Disease course: Improving  Complications assessed today?: No      Patient Problem List and Family Medical History reviewed for relevant medical history, current medical status, and diabetes risk factors.    Vitals:  There were no vitals taken for this visit.  Estimated body mass index is 34.05 kg/m  as calculated from the following:    Height as of an earlier encounter on 2/28/24: 1.825 m (5' 11.85\").    Weight as of an earlier encounter on 2/28/24: 113.4 kg (250 lb).   Last 3 BP:   BP Readings from Last 3 Encounters:   02/28/24 110/68   02/07/24 102/60   02/01/24 132/89       History   Smoking Status    Every Day    Packs/day: 1.00    Types: Cigarettes   Smokeless Tobacco    Never       Labs:  Lab Results   Component Value Date    A1C 12.1 02/28/2024     Lab Results   Component Value Date     02/07/2024     02/01/2024     Lab Results   Component Value Date    LDL  02/01/2024      Comment:      Cannot estimate LDL when triglyceride exceeds 400 mg/dL     02/01/2024     Direct Measure HDL   Date Value Ref Range Status   02/01/2024 25 (L) >=40 " "mg/dL Final   ]  GFR Estimate   Date Value Ref Range Status   02/07/2024 >90 >60 mL/min/1.73m2 Final     No results found for: \"GFRESTBLACK\"  Lab Results   Component Value Date    CR 0.92 02/07/2024     No results found for: \"MICROALBUMIN\"    Healthy Eating:  Breakfast: Eggs with steak and eng muffin  Lunch: Leftovers or mixed green salad with a source of protein. Sometimes skips.  Dinner: Steak, Chkn, Pork, Fish with vegetable and rice,pasta or potatoes.   Snacks: beef sticks or pickled herring or nuts  Other: Beverages are diet. Coffee has a little cream  Beverages: Coffee, Water, Sports drinks (Gatorade Zero)   Has decreased porion size of rice, bread, pasta, potatoes.     Being Active:  Walks dog daily for at least 30 minutes at a time.      Monitoring:  See above.       Taking Medications:  Diabetes Medication(s)       Insulin       insulin aspart (NOVOLOG FLEXPEN) 100 UNIT/ML pen Novolog Flexpen: 6 units with breakfast, 6 units with lunch, 7 units with dinner.     insulin aspart (NOVOLOG PEN) 100 UNIT/ML pen Inject 1-10 Units Subcutaneous 3 times daily (before meals) Correction Scale - HIGH INSULIN RESISTANCE DOSING   Do Not give Correction Insulin if Pre-Meal BG less than 140. For Pre-Meal  - 164 give 1 unit. For Pre-Meal  - 189 give 2 units. For Pre-Meal  - 214 give 3 units. For Pre-Meal  - 239 give 4 units. For Pre-Meal  - 264 give 5 units. For Pre-Meal  - 289 give 6 units. For Pre-Meal  - 314 give 7 units. For Pre-Meal  - 339 give 8 units. For Pre-Meal  - 364 give 9 units.  For Pre-Meal BG greater than or equal to 365 give 10 units To be given with prandial insulin, and based on pre-meal blood glucose. Administering insulin within 5 minutes of the start of the meal is ideal. Administer insulin no more than 30 minutes after the start of the meal, unless directed otherwise by provider. Notify provider if glucose greater than or equal to 350 mg/dL after " administration of correction dose.     insulin glargine (LANTUS VIAL) 100 UNIT/ML vial Take 48 units daily.     insulin glargine 100 UNIT/ML pen Inject 40 Units Subcutaneous every morning             Patient Activation Measure Survey Score:       No data to display                Time Spent: 45 minutes  Encounter Type: Individual    Any diabetes medication dose changes were made via the CDE Protocol per the patient's primary care provider. A copy of this encounter was shared with the provider.    Corina Russell RN, BSN, [facility]   Certified Diabetes Care &   [facility]

## 2024-02-29 NOTE — TELEPHONE ENCOUNTER
S/w pt and Rx is supposed to be for the injectable pen brand name.  Otherwise pt would have to pay much more.     Almaz Velasquez on 2/29/2024 at 12:54 PM

## 2024-02-29 NOTE — TELEPHONE ENCOUNTER
[facility] Call Center    Phone Message    May a detailed message be left on voicemail: yes     Reason for Call: Other: Per spouse would like to speak with Corina about having a RX corrected. Please call spouse back to discuss. Thank you.       Action Taken: Message routed to:  [facility] ENDO    Travel Screening: Not Applicable

## 2024-02-29 NOTE — PATIENT INSTRUCTIONS
PLAN  - Increase Lantus for 45 to 48 units daily.  - Increase dinner dose Novolog from 6 to 7 units. Keep breakfast and lunch dose at 6 units.  - Continue use of sliding scale, when necessary: 1 unit for every 25 points (for pre-meal bg) starting at 140.          Corina Russell, RN, BSN, [facility]   Certified Diabetes Care &   [facility]

## 2024-03-05 ENCOUNTER — TELEPHONE (OUTPATIENT)
Dept: GASTROENTEROLOGY | Facility: CLINIC | Age: 44
End: 2024-03-05
Payer: COMMERCIAL

## 2024-03-05 NOTE — TELEPHONE ENCOUNTER
"Pt waiting to schedule in september     Endoscopy Scheduling Screen    Have you had a positive Covid test in the last 14 days?  No    Are you active on MyChart?   Yes    What insurance is in the chart?  Other:  [employer]    Ordering/Referring Provider: PÉREZ   (If ordering provider performs procedure, schedule with ordering provider unless otherwise instructed. )    BMI: Estimated body mass index is 34.05 kg/m  as calculated from the following:    Height as of 2/28/24: 1.825 m (5' 11.85\").    Weight as of 2/28/24: 113.4 kg (250 lb).     Sedation Ordered  moderate sedation.   If patient BMI > 50 do not schedule in [facility].    If patient BMI > 45 do not schedule at [facility].    Are you taking methadone or Suboxone?  No    Have you had difficulties, pain, or discomfort during past endoscopy procedures?  No    Are you taking any prescription medications for pain 3 or more times per week?   NO - No RN review required.    Do you have a history of malignant hyperthermia or adverse reaction to anesthesia?  No    (Females) Are you currently pregnant?   No     Have you been diagnosed or told you have pulmonary hypertension?   No    Do you have an LVAD?  No    Have you been told you have moderate to severe sleep apnea?  No    Have you been told you have COPD, asthma, or any other lung disease?  Yes     What breathing problems do you have?  Asthma     Do you use home oxygen?  No    Have your breathing problems required an ED visit or hospitalization in the last year?  No    Do you have any heart conditions?  No     Have you ever had an organ transplant?   No    Have you ever had or are you awaiting a heart or lung transplant?   No    Have you had a stroke or transient ischemic attack (TIA aka \"mini stroke\" in the last 6 months?   No    Have you been diagnosed with or been told you have cirrhosis of the liver?   No    Are you currently on dialysis?   No    Do you need assistance transferring?   No    BMI: Estimated body mass index is 34.05 " "kg/m  as calculated from the following:    Height as of 2/28/24: 1.825 m (5' 11.85\").    Weight as of 2/28/24: 113.4 kg (250 lb).     Is patients BMI > 40 and scheduling location [facility]?  No    Do you take an injectable medication for weight loss or diabetes (excluding insulin)?  Yes, hold time can be up to 7 days. Please check with you prescribing provider for recommendation.     Do you take the medication Naltrexone?  No    Do you take blood thinners?  Yes     Are you taking Effient/Prasugrel?  No, you must contact your prescribing provider for direction on holding or bridging with a different medication.       Prep   Are you currently on dialysis or do you have chronic kidney disease?  No    Do you have a diagnosis of diabetes?  Yes (Golytely Prep)    Do you have a diagnosis of cystic fibrosis (CF)?  No    On a regular basis do you go 3 -5 days between bowel movements?  No    BMI > 40?  No    Preferred Pharmacy:    [id number]  [address]HA KELLY  [address] AT [place] [id number] [place] [address]  Phone: [phone] Fax: [phone]      Final Scheduling Details   Colonoscopy prep sent?  N/A    Procedure scheduled  Colonoscopy    Surgeon:  TBD     Date of procedure:  TBD     Pre-OP / PAC:   TBD    Location  TBD TBD    Sedation   TBD TBD      Patient Reminders:   You will receive a call from a Nurse to review instructions and health history.  This assessment must be completed prior to your procedure.  Failure to complete the Nurse assessment may result in the procedure being cancelled.      On the day of your procedure, please designate an adult(s) who can drive you home stay with you for the next 24 hours. The medicines used in the exam will make you sleepy. You will not be able to drive.      You cannot take public transportation, ride share services, or non-medical taxi service without a responsible caregiver.  Medical transport services " are allowed with the requirement that a responsible caregiver will receive you at your destination.  We require that drivers and caregivers are confirmed prior to your procedure.

## 2024-03-06 DIAGNOSIS — J30.2 SEASONAL ALLERGIC RHINITIS, UNSPECIFIED TRIGGER: ICD-10-CM

## 2024-03-06 DIAGNOSIS — J45.41 MODERATE PERSISTENT ASTHMA WITH ACUTE EXACERBATION: ICD-10-CM

## 2024-03-06 RX ORDER — MONTELUKAST SODIUM 10 MG/1
1 TABLET ORAL AT BEDTIME
Qty: 90 TABLET | Refills: 0 | Status: SHIPPED | OUTPATIENT
Start: 2024-03-06 | End: 2024-04-25

## 2024-04-03 ENCOUNTER — ALLIED HEALTH/NURSE VISIT (OUTPATIENT)
Dept: EDUCATION SERVICES | Facility: CLINIC | Age: 44
End: 2024-04-03
Payer: COMMERCIAL

## 2024-04-03 DIAGNOSIS — E11.9 TYPE 2 DIABETES MELLITUS (H): Primary | ICD-10-CM

## 2024-04-03 DIAGNOSIS — E11.9 TYPE 2 DIABETES MELLITUS (H): ICD-10-CM

## 2024-04-03 DIAGNOSIS — J45.41 MODERATE PERSISTENT ASTHMA WITH ACUTE EXACERBATION: ICD-10-CM

## 2024-04-03 PROCEDURE — G0108 DIAB MANAGE TRN  PER INDIV: HCPCS | Mod: AE

## 2024-04-03 RX ORDER — ALBUTEROL SULFATE 90 UG/1
2 AEROSOL, METERED RESPIRATORY (INHALATION) EVERY 4 HOURS PRN
Qty: 18 G | Refills: 1 | OUTPATIENT
Start: 2024-04-03

## 2024-04-03 NOTE — LETTER
4/3/2024         RE: Alexey Arriaga  [address]  [place] [address]        Dear Colleague,    Thank you for referring your patient, Alexey Arriaga, to the [facility] [place]. Please see a copy of my visit note below.    Diabetes Self-Management Education & Support    Presents for:      Type of Service: In Person Visit      ASSESSMENT:  Pt was initially referred to [facility], on 24, for new diagnosis of Type 2 Diabetes. Admitted to hospital from  -  for hyperglycemia with mild DKA. Received some diabetes education while in-patient. Patient was given an given an Accu-Check meter at that time. Self taught bg checks, meter use. Has been checking bg four times a day: fasting and before breakfast, lunch and dinner. Patient was discharged on 40 units Lantus daily and Novolog sliding scale based on pre-meal b unit for every 25 points starting at 140. Was not asked to count carbs, per se, but was encouraged by RD in hospital to keep carb count at 60-65 per meal. [facility]     Returns today for follow-up. Current insulin doses: Lantus: 48 units daily, Novolo, plus 1u:25 > 140.     BG Log -  : 2am: 142, F: 122  : F: 141, Bed: 116  : F: 177, Pre L: 158  : 2am: 142, F: 162, Pre L: 125  : 4am: 152, F: 124, Pre L: 144     7day ave: 136  14 day ave: 147  30 day ave: 147  90 day ave: 182    Pt verbalizes he is about to return to work. 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       Patient's most recent   Lab Results   Component Value Date    A1C 12.1 2024     is not meeting goal of <7.0    Diabetes knowledge and skills assessment:   Patient is knowledgeable in diabetes management concepts related to: Monitoring and Taking Medication    Continue education with the following diabetes management concepts: Healthy Eating, Being Active, Monitoring, and Taking Medication    Based on  "learning assessment above, most appropriate setting for further diabetes education would be: Individual setting.      PLAN -  Work on eating healthy and getting exercise when traveling.  Lantus: 48 to 50 units daily.     Topics to cover at upcoming visits: Healthy Eating, Being Active, Monitoring, and Taking Medication    Follow-up: Rtc in 2 months.     See Care Plan for co-developed, patient-state behavior change goals.  AVS provided for patient today.    SUBJECTIVE/OBJECTIVE:  Presents for: Follow-up  Accompanied by: Self  Diabetes education in the past 24mo: Yes   Diabetes type: Type 2  Date of diagnosis: 01/24  Disease course: Improving  How confident are you filling out medical forms by yourself:: Not Assessed  Diabetes management related comments/concerns: Improving fatigue and bg levels.  Transportation concerns: No  Difficulty affording diabetes medication?: No  Difficulty affording diabetes testing supplies?: No  Other concerns:: None  Cultural Influences/Ethnic Background:  Not  or      Diabetes Related Symptoms: None  Weight trend: Stable  Symptom course: Stable  Disease course: Improving  Complications assessed today?: No       Patient Problem List and Family Medical History reviewed for relevant medical history, current medical status, and diabetes risk factors.    Vitals:  There were no vitals taken for this visit.  Estimated body mass index is 34.05 kg/m  as calculated from the following:    Height as of 2/28/24: 1.825 m (5' 11.85\").    Weight as of 2/28/24: 113.4 kg (250 lb).   Last 3 BP:   BP Readings from Last 3 Encounters:   02/28/24 110/68   02/07/24 102/60   02/01/24 132/89       History   Smoking Status     Every Day     Packs/day: 1.00     Types: Cigarettes   Smokeless Tobacco     Never       Labs:  Lab Results   Component Value Date    A1C 12.1 02/28/2024     Lab Results   Component Value Date     02/28/2024     02/01/2024     Lab Results   Component Value Date    " "LDL  02/01/2024      Comment:      Cannot estimate LDL when triglyceride exceeds 400 mg/dL     02/01/2024     Direct Measure HDL   Date Value Ref Range Status   02/01/2024 25 (L) >=40 mg/dL Final   ]  GFR Estimate   Date Value Ref Range Status   02/28/2024 >90 >60 mL/min/1.73m2 Final     No results found for: \"GFRESTBLACK\"  Lab Results   Component Value Date    CR 0.88 02/28/2024     No results found for: \"MICROALBUMIN\"    Healthy Eating:  Breakfast: Eggs with steak and eng muffin  Lunch: Leftovers or mixed green salad with a source of protein. Sometimes skips.  Dinner: Steak, Chkn, Pork, Fish with vegetable and rice,pasta or potatoes.   Snacks: beef sticks or pickled herring or nuts  Other: Beverages are diet. Coffee has a little cream  Beverages: Coffee, Water, Sports drinks (Gatorade Zero)   Has decreased porion size of rice, bread, pasta, potatoes.     Being Active:  Walks dog daily for at least 30 minutes at a time.        Taking Medications:  Diabetes Medication(s)       Insulin       insulin aspart (NOVOLOG FLEXPEN) 100 UNIT/ML pen Novolog Flexpen: 6 units with breakfast, 6 units with lunch, 7 units with dinner.     insulin aspart (NOVOLOG PEN) 100 UNIT/ML pen Inject 1-10 Units Subcutaneous 3 times daily (before meals) Correction Scale - HIGH INSULIN RESISTANCE DOSING   Do Not give Correction Insulin if Pre-Meal BG less than 140. For Pre-Meal  - 164 give 1 unit. For Pre-Meal  - 189 give 2 units. For Pre-Meal  - 214 give 3 units. For Pre-Meal  - 239 give 4 units. For Pre-Meal  - 264 give 5 units. For Pre-Meal  - 289 give 6 units. For Pre-Meal  - 314 give 7 units. For Pre-Meal  - 339 give 8 units. For Pre-Meal  - 364 give 9 units.  For Pre-Meal BG greater than or equal to 365 give 10 units To be given with prandial insulin, and based on pre-meal blood glucose. Administering insulin within 5 minutes of the start of the meal is ideal. Administer insulin " no more than 30 minutes after the start of the meal, unless directed otherwise by provider. Notify provider if glucose greater than or equal to 350 mg/dL after administration of correction dose.     insulin glargine 100 UNIT/ML pen Inject 48 Units Subcutaneous every morning Insurance requires Basaglar pen             Patient Activation Measure Survey Score:       No data to display                Time Spent: 30 minutes  Encounter Type: Individual    Any diabetes medication dose changes were made via the CDE Protocol per the patient's primary care provider. A copy of this encounter was shared with the provider.    Corina Russell RN, BSN, [facility]   Certified Diabetes Care &   [facility]

## 2024-04-09 DIAGNOSIS — J30.2 SEASONAL ALLERGIC RHINITIS, UNSPECIFIED TRIGGER: ICD-10-CM

## 2024-04-09 DIAGNOSIS — J45.41 MODERATE PERSISTENT ASTHMA WITH ACUTE EXACERBATION: ICD-10-CM

## 2024-04-09 RX ORDER — MONTELUKAST SODIUM 10 MG/1
1 TABLET ORAL AT BEDTIME
Qty: 90 TABLET | Refills: 0 | OUTPATIENT
Start: 2024-04-09

## 2024-04-09 NOTE — PATIENT INSTRUCTIONS
PLAN -  Work on eating healthy and getting exercise when traveling.  Lantus: 48 to 50 units daily.           Corina Russell, RN, BSN, [facility]   Certified Diabetes Care &   [facility]

## 2024-04-09 NOTE — PROGRESS NOTES
Diabetes Self-Management Education & Support    Presents for:      Type of Service: In Person Visit      ASSESSMENT:  Pt was initially referred to [facility], on 24, for new diagnosis of Type 2 Diabetes. Admitted to hospital from  -  for hyperglycemia with mild DKA. Received some diabetes education while in-patient. Patient was given an given an Accu-Check meter at that time. Self taught bg checks, meter use. Has been checking bg four times a day: fasting and before breakfast, lunch and dinner. Patient was discharged on 40 units Lantus daily and Novolog sliding scale based on pre-meal b unit for every 25 points starting at 140. Was not asked to count carbs, per se, but was encouraged by RD in hospital to keep carb count at 60-65 per meal. Last seen by [facility], 24.     Returns today for follow-up. Current insulin doses: Lantus: 48 units daily, Novolo, plus 1u:25 > 140.     BG Log -  : 2am: 142, F: 122  : F: 141, Bed: 116  : F: 177, Pre L: 158  : 2am: 142, F: 162, Pre L: 125  : 4am: 152, F: 124, Pre L: 144     7day ave: 136  14 day ave: 147  30 day ave: 147  90 day ave: 182    Pt verbalizes he is about to return to work. 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       Patient's most recent   Lab Results   Component Value Date    A1C 12.1 2024     is not meeting goal of <7.0    Diabetes knowledge and skills assessment:   Patient is knowledgeable in diabetes management concepts related to: Monitoring and Taking Medication    Continue education with the following diabetes management concepts: Healthy Eating, Being Active, Monitoring, and Taking Medication    Based on learning assessment above, most appropriate setting for further diabetes education would be: Individual setting.      PLAN -  Work on eating healthy and getting exercise when traveling.  Lantus: 48 to 50 units daily.     Topics to cover at upcoming visits: Healthy Eating,  "Being Active, Monitoring, and Taking Medication    Follow-up: Rtc in 2 months.     See Care Plan for co-developed, patient-state behavior change goals.  AVS provided for patient today.    SUBJECTIVE/OBJECTIVE:  Presents for: Follow-up  Accompanied by: Self  Diabetes education in the past 24mo: Yes   Diabetes type: Type 2  Date of diagnosis: 01/24  Disease course: Improving  How confident are you filling out medical forms by yourself:: Not Assessed  Diabetes management related comments/concerns: Improving fatigue and bg levels.  Transportation concerns: No  Difficulty affording diabetes medication?: No  Difficulty affording diabetes testing supplies?: No  Other concerns:: None  Cultural Influences/Ethnic Background:  Not  or      Diabetes Related Symptoms: None  Weight trend: Stable  Symptom course: Stable  Disease course: Improving  Complications assessed today?: No       Patient Problem List and Family Medical History reviewed for relevant medical history, current medical status, and diabetes risk factors.    Vitals:  There were no vitals taken for this visit.  Estimated body mass index is 34.05 kg/m  as calculated from the following:    Height as of 2/28/24: 1.825 m (5' 11.85\").    Weight as of 2/28/24: 113.4 kg (250 lb).   Last 3 BP:   BP Readings from Last 3 Encounters:   02/28/24 110/68   02/07/24 102/60   02/01/24 132/89       History   Smoking Status    Every Day    Packs/day: 1.00    Types: Cigarettes   Smokeless Tobacco    Never       Labs:  Lab Results   Component Value Date    A1C 12.1 02/28/2024     Lab Results   Component Value Date     02/28/2024     02/01/2024     Lab Results   Component Value Date    LDL  02/01/2024      Comment:      Cannot estimate LDL when triglyceride exceeds 400 mg/dL     02/01/2024     Direct Measure HDL   Date Value Ref Range Status   02/01/2024 25 (L) >=40 mg/dL Final   ]  GFR Estimate   Date Value Ref Range Status   02/28/2024 >90 >60 " "mL/min/1.73m2 Final     No results found for: \"GFRESTBLACK\"  Lab Results   Component Value Date    CR 0.88 02/28/2024     No results found for: \"MICROALBUMIN\"    Healthy Eating:  Breakfast: Eggs with steak and eng muffin  Lunch: Leftovers or mixed green salad with a source of protein. Sometimes skips.  Dinner: Steak, Chkn, Pork, Fish with vegetable and rice,pasta or potatoes.   Snacks: beef sticks or pickled herring or nuts  Other: Beverages are diet. Coffee has a little cream  Beverages: Coffee, Water, Sports drinks (Gatorade Zero)   Has decreased porion size of rice, bread, pasta, potatoes.     Being Active:  Walks dog daily for at least 30 minutes at a time.        Taking Medications:  Diabetes Medication(s)       Insulin       insulin aspart (NOVOLOG FLEXPEN) 100 UNIT/ML pen Novolog Flexpen: 6 units with breakfast, 6 units with lunch, 7 units with dinner.     insulin aspart (NOVOLOG PEN) 100 UNIT/ML pen Inject 1-10 Units Subcutaneous 3 times daily (before meals) Correction Scale - HIGH INSULIN RESISTANCE DOSING   Do Not give Correction Insulin if Pre-Meal BG less than 140. For Pre-Meal  - 164 give 1 unit. For Pre-Meal  - 189 give 2 units. For Pre-Meal  - 214 give 3 units. For Pre-Meal  - 239 give 4 units. For Pre-Meal  - 264 give 5 units. For Pre-Meal  - 289 give 6 units. For Pre-Meal  - 314 give 7 units. For Pre-Meal  - 339 give 8 units. For Pre-Meal  - 364 give 9 units.  For Pre-Meal BG greater than or equal to 365 give 10 units To be given with prandial insulin, and based on pre-meal blood glucose. Administering insulin within 5 minutes of the start of the meal is ideal. Administer insulin no more than 30 minutes after the start of the meal, unless directed otherwise by provider. Notify provider if glucose greater than or equal to 350 mg/dL after administration of correction dose.     insulin glargine 100 UNIT/ML pen Inject 48 Units Subcutaneous every " morning Insurance requires Basaglar pen             Patient Activation Measure Survey Score:       No data to display                Time Spent: 30 minutes  Encounter Type: Individual    Any diabetes medication dose changes were made via the CDE Protocol per the patient's primary care provider. A copy of this encounter was shared with the provider.    Corina Russell RN, BSN, [facility]   Certified Diabetes Care &   [facility]

## 2024-04-11 DIAGNOSIS — J30.2 SEASONAL ALLERGIC RHINITIS, UNSPECIFIED TRIGGER: ICD-10-CM

## 2024-04-11 DIAGNOSIS — J45.41 MODERATE PERSISTENT ASTHMA WITH ACUTE EXACERBATION: ICD-10-CM

## 2024-04-11 RX ORDER — MONTELUKAST SODIUM 10 MG/1
1 TABLET ORAL AT BEDTIME
Qty: 90 TABLET | Refills: 0 | OUTPATIENT
Start: 2024-04-11

## 2024-04-21 DIAGNOSIS — J30.2 SEASONAL ALLERGIC RHINITIS, UNSPECIFIED TRIGGER: ICD-10-CM

## 2024-04-21 DIAGNOSIS — J45.41 MODERATE PERSISTENT ASTHMA WITH ACUTE EXACERBATION: ICD-10-CM

## 2024-04-21 PROBLEM — Z71.6 ENCOUNTER FOR SMOKING CESSATION COUNSELING: Status: ACTIVE | Noted: 2024-04-21

## 2024-04-22 RX ORDER — MONTELUKAST SODIUM 10 MG/1
1 TABLET ORAL AT BEDTIME
Qty: 90 TABLET | Refills: 0 | OUTPATIENT
Start: 2024-04-22

## 2024-04-24 DIAGNOSIS — J45.41 MODERATE PERSISTENT ASTHMA WITH ACUTE EXACERBATION: ICD-10-CM

## 2024-04-24 DIAGNOSIS — J30.2 SEASONAL ALLERGIC RHINITIS, UNSPECIFIED TRIGGER: ICD-10-CM

## 2024-04-25 DIAGNOSIS — J45.41 MODERATE PERSISTENT ASTHMA WITH ACUTE EXACERBATION: ICD-10-CM

## 2024-04-25 DIAGNOSIS — J30.2 SEASONAL ALLERGIC RHINITIS, UNSPECIFIED TRIGGER: ICD-10-CM

## 2024-04-25 RX ORDER — MONTELUKAST SODIUM 10 MG/1
1 TABLET ORAL AT BEDTIME
Qty: 90 TABLET | Refills: 0 | OUTPATIENT
Start: 2024-04-25

## 2024-04-25 RX ORDER — MONTELUKAST SODIUM 10 MG/1
1 TABLET ORAL AT BEDTIME
Qty: 90 TABLET | Refills: 0 | Status: SHIPPED | OUTPATIENT
Start: 2024-04-25 | End: 2024-08-21

## 2024-05-16 ENCOUNTER — TELEPHONE (OUTPATIENT)
Dept: FAMILY MEDICINE | Facility: CLINIC | Age: 44
End: 2024-05-16

## 2024-05-16 NOTE — TELEPHONE ENCOUNTER
Patient Quality Outreach    Patient is due for the following:   Asthma  -  ACT needed  Colon Cancer Screening    Next Steps:   Patient was assigned appropriate questionnaire to complete    Type of outreach:    Sent [employer] message.      Questions for provider review:    None           Saniya Hidalgo, CMA

## 2024-06-21 ENCOUNTER — TELEPHONE (OUTPATIENT)
Dept: EDUCATION SERVICES | Facility: CLINIC | Age: 44
End: 2024-06-21
Payer: COMMERCIAL

## 2024-06-21 NOTE — TELEPHONE ENCOUNTER
[facility] Call Center    Phone Message    May a detailed message be left on voicemail: yes     Reason for Call: Other: Patient had to cancel his appt for Monday, he is going back to work, asked if you could give him a call, I tried to reschedule but he said he would be busy for the next month..       Action Taken: Message routed to:  [facility] DIAB ED    Travel Screening: Not Applicable     Date of Service:

## 2024-06-21 NOTE — TELEPHONE ENCOUNTER
Called out left voicemail.  Encouraged scheduling diabetes check and alternatively can ask questions via [employer].     Annie Yo MS, RD, LD, CDE

## 2024-06-25 NOTE — TELEPHONE ENCOUNTER
Sent [employer] message also and [facility].     Will close TE encounter.     Nicole Dickens RD, LD, [facility]

## 2024-07-12 DIAGNOSIS — E11.9 TYPE 2 DIABETES MELLITUS (H): ICD-10-CM

## 2024-07-12 RX ORDER — INSULIN GLARGINE 100 [IU]/ML
INJECTION, SOLUTION SUBCUTANEOUS
Qty: 15 ML | Refills: 0 | Status: SHIPPED | OUTPATIENT
Start: 2024-07-12 | End: 2024-08-07

## 2024-07-28 ENCOUNTER — HEALTH MAINTENANCE LETTER (OUTPATIENT)
Age: 44
End: 2024-07-28

## 2024-08-06 DIAGNOSIS — I10 BENIGN ESSENTIAL HYPERTENSION: ICD-10-CM

## 2024-08-06 DIAGNOSIS — E11.9 TYPE 2 DIABETES MELLITUS (H): ICD-10-CM

## 2024-08-06 RX ORDER — CHLORTHALIDONE 25 MG/1
25 TABLET ORAL DAILY
Qty: 90 TABLET | Refills: 1 | Status: SHIPPED | OUTPATIENT
Start: 2024-08-06

## 2024-08-06 RX ORDER — LISINOPRIL 40 MG/1
40 TABLET ORAL DAILY
Qty: 90 TABLET | Refills: 1 | Status: SHIPPED | OUTPATIENT
Start: 2024-08-06

## 2024-08-06 NOTE — LETTER
August 14, 2024      Alexey Arriaga  [address]   [place] [address]          Dear Mr. Arriaga,    Our records indicate that it is time to schedule a visit with your primary care provider.  You are due to be seen for a follow-up of medications.  We have sent to the pharmacy a  1 time  refill of your medication until you can be seen by your provider.  You may call [phone] to schedule or via [employer] using the appointment tab.  If you are no longer a [facility] patient; please contact us and let us know that as well.  You will need to let the pharmacy know the name of your new provider so that they can send future refill requests to them.    Sincerely,    [facility] [place]

## 2024-08-07 RX ORDER — INSULIN GLARGINE 100 [IU]/ML
INJECTION, SOLUTION SUBCUTANEOUS
Qty: 15 ML | Refills: 0 | Status: SHIPPED | OUTPATIENT
Start: 2024-08-07 | End: 2024-09-06

## 2024-08-07 NOTE — TELEPHONE ENCOUNTER
Script refill faxed x 1 only . Remind pt to do follow up for med check and labs, since pt  is due.     Virtual/ Video visit ok  If no appointment available soon enough

## 2024-08-12 NOTE — TELEPHONE ENCOUNTER
Left voicemail letting patient know we sent him a [employer] message informing him refill was sent to pharmacy, he needs appointment with provider for medication recheck and lab appointment for further refills. Dr. Jama ok'd  Virtual Visit if nothing sooner.     Lux   Visit Facilitator

## 2024-08-21 ENCOUNTER — LAB (OUTPATIENT)
Dept: LAB | Facility: CLINIC | Age: 44
End: 2024-08-21
Payer: COMMERCIAL

## 2024-08-21 ENCOUNTER — VIRTUAL VISIT (OUTPATIENT)
Dept: FAMILY MEDICINE | Facility: CLINIC | Age: 44
End: 2024-08-21
Payer: COMMERCIAL

## 2024-08-21 ENCOUNTER — MYC MEDICAL ADVICE (OUTPATIENT)
Dept: FAMILY MEDICINE | Facility: CLINIC | Age: 44
End: 2024-08-21

## 2024-08-21 DIAGNOSIS — H69.93 DYSFUNCTION OF BOTH EUSTACHIAN TUBES: ICD-10-CM

## 2024-08-21 DIAGNOSIS — J30.2 SEASONAL ALLERGIC RHINITIS, UNSPECIFIED TRIGGER: ICD-10-CM

## 2024-08-21 DIAGNOSIS — E11.69 MIXED HYPERLIPIDEMIA DUE TO TYPE 2 DIABETES MELLITUS (H): ICD-10-CM

## 2024-08-21 DIAGNOSIS — E78.2 MIXED HYPERLIPIDEMIA DUE TO TYPE 2 DIABETES MELLITUS (H): ICD-10-CM

## 2024-08-21 DIAGNOSIS — Z79.4 TYPE 2 DIABETES MELLITUS WITHOUT COMPLICATION, WITH LONG-TERM CURRENT USE OF INSULIN (H): Primary | ICD-10-CM

## 2024-08-21 DIAGNOSIS — Z79.4 TYPE 2 DIABETES MELLITUS WITHOUT COMPLICATION, WITH LONG-TERM CURRENT USE OF INSULIN (H): ICD-10-CM

## 2024-08-21 DIAGNOSIS — J45.40 MODERATE PERSISTENT ASTHMA WITHOUT COMPLICATION: ICD-10-CM

## 2024-08-21 DIAGNOSIS — I10 BENIGN ESSENTIAL HYPERTENSION: ICD-10-CM

## 2024-08-21 DIAGNOSIS — E11.9 TYPE 2 DIABETES MELLITUS WITHOUT COMPLICATION, WITH LONG-TERM CURRENT USE OF INSULIN (H): ICD-10-CM

## 2024-08-21 DIAGNOSIS — E11.9 TYPE 2 DIABETES MELLITUS WITHOUT COMPLICATION, WITH LONG-TERM CURRENT USE OF INSULIN (H): Primary | ICD-10-CM

## 2024-08-21 PROBLEM — E11.00 HYPEROSMOLAR HYPERGLYCEMIC STATE (HHS) (H): Status: RESOLVED | Noted: 2024-01-30 | Resolved: 2024-08-21

## 2024-08-21 PROBLEM — R73.9 HYPERGLYCEMIA: Status: RESOLVED | Noted: 2024-01-30 | Resolved: 2024-08-21

## 2024-08-21 LAB
CHOLEST SERPL-MCNC: 196 MG/DL
FASTING STATUS PATIENT QL REPORTED: NO
HBA1C MFR BLD: 6.8 % (ref 0–5.6)
HDLC SERPL-MCNC: 29 MG/DL
LDLC SERPL CALC-MCNC: ABNORMAL MG/DL
LDLC SERPL DIRECT ASSAY-MCNC: 117 MG/DL
NONHDLC SERPL-MCNC: 167 MG/DL
TRIGL SERPL-MCNC: 519 MG/DL

## 2024-08-21 PROCEDURE — G2211 COMPLEX E/M VISIT ADD ON: HCPCS | Mod: 95 | Performed by: PHYSICIAN ASSISTANT

## 2024-08-21 PROCEDURE — 36415 COLL VENOUS BLD VENIPUNCTURE: CPT

## 2024-08-21 PROCEDURE — 83721 ASSAY OF BLOOD LIPOPROTEIN: CPT

## 2024-08-21 PROCEDURE — 83036 HEMOGLOBIN GLYCOSYLATED A1C: CPT

## 2024-08-21 PROCEDURE — 99214 OFFICE O/P EST MOD 30 MIN: CPT | Mod: 95 | Performed by: PHYSICIAN ASSISTANT

## 2024-08-21 PROCEDURE — 80061 LIPID PANEL: CPT

## 2024-08-21 RX ORDER — MONTELUKAST SODIUM 10 MG/1
1 TABLET ORAL AT BEDTIME
Qty: 90 TABLET | Refills: 1 | Status: SHIPPED | OUTPATIENT
Start: 2024-08-21

## 2024-08-21 RX ORDER — IPRATROPIUM BROMIDE AND ALBUTEROL SULFATE 2.5; .5 MG/3ML; MG/3ML
1 SOLUTION RESPIRATORY (INHALATION) EVERY 6 HOURS PRN
Qty: 90 ML | Refills: 1 | Status: SHIPPED | OUTPATIENT
Start: 2024-08-21

## 2024-08-21 ASSESSMENT — ASTHMA QUESTIONNAIRES
HOSPITALIZATION_OVERNIGHT_LAST_YEAR_TOTAL: ONE
ACT_TOTALSCORE: 20
ACT_TOTALSCORE: 20
QUESTION_5 LAST FOUR WEEKS HOW WOULD YOU RATE YOUR ASTHMA CONTROL: WELL CONTROLLED
QUESTION_1 LAST FOUR WEEKS HOW MUCH OF THE TIME DID YOUR ASTHMA KEEP YOU FROM GETTING AS MUCH DONE AT WORK, SCHOOL OR AT HOME: A LITTLE OF THE TIME
QUESTION_3 LAST FOUR WEEKS HOW OFTEN DID YOUR ASTHMA SYMPTOMS (WHEEZING, COUGHING, SHORTNESS OF BREATH, CHEST TIGHTNESS OR PAIN) WAKE YOU UP AT NIGHT OR EARLIER THAN USUAL IN THE MORNING: ONCE OR TWICE
EMERGENCY_ROOM_LAST_YEAR_TOTAL: ONE
QUESTION_4 LAST FOUR WEEKS HOW OFTEN HAVE YOU USED YOUR RESCUE INHALER OR NEBULIZER MEDICATION (SUCH AS ALBUTEROL): ONCE A WEEK OR LESS
QUESTION_2 LAST FOUR WEEKS HOW OFTEN HAVE YOU HAD SHORTNESS OF BREATH: ONCE OR TWICE A WEEK

## 2024-08-21 NOTE — PROGRESS NOTES
Dennis is a 44 year old who is being evaluated via a billable video visit.    How would you like to obtain your AVS? MyChart  If the video visit is dropped, the invitation should be resent by: Text to cell phone: [phone]  Will anyone else be joining your video visit? No      Assessment & Plan       ICD-10-CM    1. Type 2 diabetes mellitus without complication, with long-term current use of insulin (H)  E11.9 Hemoglobin A1c    Z79.4 Lipid panel reflex to direct LDL Non-fasting      2. Moderate persistent asthma without complication  J45.40 montelukast (SINGULAIR) 10 MG tablet     ipratropium - albuterol 0.5 mg/2.5 mg/3 mL (DUONEB) 0.5-2.5 (3) MG/3ML neb solution     Nebulizer and Supplies Order      3. Seasonal allergic rhinitis, unspecified trigger  J30.2 montelukast (SINGULAIR) 10 MG tablet      4. Mixed hyperlipidemia due to type 2 diabetes mellitus (H)  E11.69 Lipid panel reflex to direct LDL Non-fasting    E78.2       5. Benign essential hypertension  I10       6. Dysfunction of both eustachian tubes  H69.93         - Due for recheck labs, A1c and lipids. Suspect A1c will be within goal based on reported FBG at home. Lipids quite elevated at time of DM2 diagnosis earlier this year, have not yet been rechecked -- anticipate they will improve with improved blood sugars but need to evaluate need for statin. Continue medications at same doses.  - Asthma, allergies well managed on current regimen; continue without change.   - BP borderline per patient report, will continue same dose for now and recheck at next visit.  - Suspect ETD causing ear discomfort. Recommend switching to over-ear protection rather than in-ear. May apply some warm mineral oil to help with any skin irritation. Discussed symptoms that warrant recheck.    The longitudinal plan of care for the diagnosis(es)/condition(s) as documented were addressed during this visit. Due to the added complexity in care, I will continue to support Dennis in the  subsequent management and with ongoing continuity of care.    Subjective   Dennis is a 44 year old, presenting for the following health issues:  Hypertension and Diabetes        8/21/2024    12:25 PM   Additional Questions   Roomed by Timmy ANDERSEN     Diabetes Follow-up    How often are you checking your blood sugar? Two times daily  Blood sugar testing frequency justification:  Risk of hypoglycemia with medication(s)  What time of day are you checking your blood sugars (select all that apply)?  Before meals  Have you had any blood sugars above 200?  No  Have you had any blood sugars below 70?  No  What symptoms do you notice when your blood sugar is low?  Wakes with a headache when BS is low  What concerns do you have today about your diabetes? None   Do you have any of these symptoms? (Select all that apply)  No numbness or tingling in feet.  No redness, sores or blisters on feet.  No complaints of excessive thirst.  No reports of blurry vision.  No significant changes to weight.      BP Readings from Last 2 Encounters:   02/28/24 110/68   02/07/24 102/60     Hemoglobin A1C (%)   Date Value   02/28/2024 12.1 (H)   01/30/2024 16.5 (H)     LDL Cholesterol Calculated (no units)   Date Value   02/01/2024      Comment:     Cannot estimate LDL when triglyceride exceeds 400 mg/dL     LDL Cholesterol Direct (mg/dL)   Date Value   02/01/2024 119 (H)             Hypertension Follow-up    Do you check your blood pressure regularly outside of the clinic? Yes   Are you following a low salt diet? No  Are your blood pressures ever more than 140 on the top number (systolic) OR more   than 90 on the bottom number (diastolic), for example 140/90? Yes    Asthma Follow-Up      Was ACT completed today?  Yes        8/21/2024    12:29 PM   ACT Total Scores   ACT TOTAL SCORE (Goal Greater than or Equal to 20) 20   In the past 12 months, how many times did you visit the emergency room for your asthma without being admitted to the hospital?  1   In the past 12 months, how many times were you hospitalized overnight because of your asthma? 1     FBG usually in 110-130 range. Taking 50 units glargine at bedtime and novolog 6-7 units prior to meal as needed.    Needs new nebulizer. Travels for work and works outdoors, so asthma and allergies usually triggered by pollens, environmental allergens. Well controlled with current therapies.     Wears in-ear protection during work hours, usually has it in for 3-4 hours at a time. Ears ache and feel full of fluid when wearing them. He also has been at [id number] feet elevation for work the past several months.         Review of Systems  Constitutional, HEENT, cardiovascular, pulmonary, GI, , musculoskeletal, neuro, skin, endocrine and psych systems are negative, except as otherwise noted.      Objective    Vitals - Patient Reported  Systolic (Patient Reported): (!) 140  Diastolic (Patient Reported): 78  SpO2 (Patient Reported): 94  Pulse (Patient Reported): 96      Vitals:  No vitals were obtained today due to virtual visit.    Physical Exam   GENERAL: alert and no distress  EYES: Eyes grossly normal to inspection.  No discharge or erythema, or obvious scleral/conjunctival abnormalities.  HENT: Normal cephalic/atraumatic.  External ears, nose and mouth without ulcers or lesions.  No nasal drainage visible.  NECK: No asymmetry, visible masses or scars  RESP: No audible wheeze, cough, or visible cyanosis.    MS: No gross musculoskeletal defects noted.  Normal range of motion.  No visible edema.  SKIN: Visible skin clear. No significant rash, abnormal pigmentation or lesions.  PSYCH: Appropriate affect, tone, and pace of words          Video-Visit Details    Type of service:  Video Visit   Originating Location (pt. Location): Home    Distant Location (provider location):  Off-site  Platform used for Video Visit: Kim  Signed Electronically by: RADHA Frey (Durable Medical Equipment) Orders and  Documentation  Orders Placed This Encounter   Procedures    Nebulizer and Supplies Order        The patient was assessed and it was determined the patient is in need of the following listed DME Supplies/Equipment. Please complete supporting documentation below to demonstrate medical necessity.

## 2024-09-05 DIAGNOSIS — E11.9 TYPE 2 DIABETES MELLITUS (H): ICD-10-CM

## 2024-09-06 RX ORDER — INSULIN GLARGINE 100 [IU]/ML
48 INJECTION, SOLUTION SUBCUTANEOUS EVERY MORNING
Qty: 44 ML | Refills: 0 | Status: SHIPPED | OUTPATIENT
Start: 2024-09-06

## 2024-09-12 DIAGNOSIS — J45.41 MODERATE PERSISTENT ASTHMA WITH ACUTE EXACERBATION: ICD-10-CM

## 2024-09-21 RX ORDER — FLUTICASONE PROPIONATE AND SALMETEROL 250; 50 UG/1; UG/1
1 POWDER RESPIRATORY (INHALATION) 2 TIMES DAILY
Qty: 1 EACH | Refills: 1 | Status: SHIPPED | OUTPATIENT
Start: 2024-09-21

## 2024-10-16 ENCOUNTER — MYC MEDICAL ADVICE (OUTPATIENT)
Dept: FAMILY MEDICINE | Facility: CLINIC | Age: 44
End: 2024-10-16
Payer: COMMERCIAL

## 2024-10-16 ENCOUNTER — TELEPHONE (OUTPATIENT)
Dept: FAMILY MEDICINE | Facility: CLINIC | Age: 44
End: 2024-10-16
Payer: COMMERCIAL

## 2024-10-16 ENCOUNTER — NURSE TRIAGE (OUTPATIENT)
Dept: FAMILY MEDICINE | Facility: CLINIC | Age: 44
End: 2024-10-16
Payer: COMMERCIAL

## 2024-10-16 ASSESSMENT — ASTHMA QUESTIONNAIRES
QUESTION_3 LAST FOUR WEEKS HOW OFTEN DID YOUR ASTHMA SYMPTOMS (WHEEZING, COUGHING, SHORTNESS OF BREATH, CHEST TIGHTNESS OR PAIN) WAKE YOU UP AT NIGHT OR EARLIER THAN USUAL IN THE MORNING: ONCE OR TWICE
QUESTION_1 LAST FOUR WEEKS HOW MUCH OF THE TIME DID YOUR ASTHMA KEEP YOU FROM GETTING AS MUCH DONE AT WORK, SCHOOL OR AT HOME: A LITTLE OF THE TIME
ACT_TOTALSCORE: 13
QUESTION_2 LAST FOUR WEEKS HOW OFTEN HAVE YOU HAD SHORTNESS OF BREATH: MORE THAN ONCE A DAY
ACT_TOTALSCORE: 13
QUESTION_5 LAST FOUR WEEKS HOW WOULD YOU RATE YOUR ASTHMA CONTROL: SOMEWHAT CONTROLLED
QUESTION_4 LAST FOUR WEEKS HOW OFTEN HAVE YOU USED YOUR RESCUE INHALER OR NEBULIZER MEDICATION (SUCH AS ALBUTEROL): THREE OR MORE TIMES PER DAY

## 2024-10-16 NOTE — TELEPHONE ENCOUNTER
Patient Quality Outreach    Patient is due for the following:   Asthma  -  ACT needed  Physical Preventive Adult Physical    Next Steps:   Schedule a Adult Preventative  Patient was assigned appropriate questionnaire to complete    Type of outreach:    Sent [employer] message.      Questions for provider review:    None           Sunita Booker MA

## 2024-10-16 NOTE — TELEPHONE ENCOUNTER
Patient completed an ACT questionnaire via [employer] and score was lower than last time. Patient reported on the questionnaire that he uses his inhaler 3 or more times a day.        2/28/2024    11:24 AM 8/21/2024    12:29 PM 10/16/2024     1:34 PM   ACT Total Scores   ACT TOTAL SCORE (Goal Greater than or Equal to 20) 19 20 13   In the past 12 months, how many times did you visit the emergency room for your asthma without being admitted to the hospital? 3 1 0   In the past 12 months, how many times were you hospitalized overnight because of your asthma? 1 1 0     Called patient to get some more information. Acting up the last week due to the leaves changes. His allergies and asthma is worse in the fall and spring. He has mostly been using the inhaler in the morning and night. He stated that is asthma is the same as it was last fall. Patient stated that he would be interested in possibly looking into something else to better control his asthma during the fall. Patient was triaged and scheduled for an appointment with an available provider.    Reason for Disposition   MILD asthma attack (e.g., no SOB at rest, mild SOB with walking, speaks normally in sentences, mild wheezing) and lasting > 24 hours on prescribed treatment    Additional Information   Negative: SEVERE asthma attack (e.g., struggling for each breath, speaks in single words, loud wheezes, pulse >120) (RED Zone)   Negative: Bluish (or gray) lips or face   Negative: Difficult to awaken or acting confused (e.g., disoriented, slurred speech)   Negative: Passed out (i.e., fainted, collapsed and was not responding)   Negative: Wheezing started suddenly after medicine, an allergic food, or bee sting   Negative: Sounds like a life-threatening emergency to the triager   Negative: MODERATE asthma attack (e.g., SOB at rest, speaks in phrases, audible wheezes) AND doesn't have neb or inhaler available   Negative: Peak flow rate less than 50% of baseline level (RED  "Zone)   Negative: Oxygen level (e.g., pulse oximetry) 90% or lower   Negative: Hospitalized before with asthma; now feels same   Negative: MODERATE asthma attack (e.g., SOB at rest, speaks in phrases, audible wheezes) and not resolved after 2 or 3 inhaler or nebulizer treatments given 20 minutes apart   Negative: Peak flow rate 50-79% of baseline level (YELLOW zone) after using 2 or 3 inhaler nebulizer treatments given 20 minutes) apart   Negative: Chest pain  (Exception: Mild chest tightness that feels the same as prior asthma attacks.)   Negative: Patient sounds very sick or weak to the triager   Negative: Quick-relief asthma medicine (e.g., albuterol /salbutamol, levalbuterol by inhaler or nebulizer) is needed more frequently than every 4 hours to keep you comfortable   Negative: Continuous (nonstop) coughing that keeps patient from working or sleeping, and not improved after 2 or 3 inhaler or nebulizer treatments given 20 minutes apart   Negative: Fever > 103 F (39.4 C)   Negative: Fever > 101 F (38.3 C) and over 60 years of age   Negative: Fever present > 3 days (72 hours)   Negative: Coughing up gumaro-colored or yellow-green sputum   Negative: Patient wants to be seen   Negative: Oxygen level (e.g., pulse oximetry) 91 to 94%   Negative: COVID-19 diagnosed or suspected (e.g., COVID-19 present in community, known COVID-19 exposure, positive test) AND has COVID symptoms (e.g., cough, fever)   Negative: Influenza diagnosed or suspected AND has FLU symptoms (e.g., cough with fever)    Answer Assessment - Initial Assessment Questions  1. RESPIRATORY STATUS: \"Describe your breathing?\" (e.g., wheezing, shortness of breath, unable to speak, severe coughing)         Cough and mild SOB on and off during throughout the day    2. ONSET: \"When did this asthma attack begin?\"         A week ago    3. TRIGGER: \"What do you think triggered this attack?\" (e.g., URI, exposure to pollen or other allergen, tobacco smoke)       " "  Usually bad during the fall and spring    4. PEAK EXPIRATORY FLOW RATE (PEFR): \"Do you use a peak flow meter?\" If Yes, ask: \"What's the current peak flow? What's your personal best peak flow?\"         Does not have one    5. SEVERITY: \"How bad is this attack?\"     - MILD: No SOB at rest, mild SOB with walking, speaks normally in sentences, can lie down, no retractions, pulse < 100. (GREEN Zone: PEFR %)    - MODERATE: SOB at rest, SOB with minimal exertion and prefers to sit, cannot lie down flat, speaks in phrases, mild retractions, audible wheezing, pulse 100-120. (YELLOW Zone: PEFR 50-79%)     - SEVERE: Struggling for each breath, speaks in single words, struggling to breathe, sitting hunched forward, retractions, usually loud wheezing, sometimes minimal wheezing because of decreased air movement, pulse > 120. (RED Zone: PEFR < 50%).         Mild    6. ASTHMA MEDICINES:  \"What treatments have you tried?\"     - INHALED QUICK RELIEF (RESCUE): \"What is your inhaled quick-relief medicine?\" (e.g., albuterol, salbutamol) \"Do you use an inhaler or a nebulizer?\" \"How frequently have you been using this medicine?\"    - CONTROLLER (LONG-TERM-CONTROL): \"Do you take an inhaled steroid? (e.g., Asmanex, Flovent, Pulmicort, Qvar)        Yes, inhaler and neb    7. INHALED QUICK-RELIEF TREATMENTS FOR THIS ATTACK: \"What treatments have you given yourself so far?\" and \"How many and how often?\" If using an inhaler, ask, \"How many puffs?\" Note: Routine treatments are 2 puffs every 4 hours as needed. Rescue treatments are 4 puffs repeated every 20 minutes, up to three times as needed.         See med list    8. OTHER SYMPTOMS: \"Do you have any other symptoms? (e.g., chest pain, coughing up yellow sputum, fever, runny nose)        Headache, runny nose (seasonal allergies)    9. O2 SATURATION MONITOR:  \"Do you use an oxygen saturation monitor (pulse oximeter) at home?\" If Yes, \"What is your reading (oxygen level) today?\" \"What is " "your usual oxygen saturation reading?\" (e.g., 95%)        94-97    10. PREGNANCY: \"Is there any chance you are pregnant?\" \"When was your last menstrual period?\"          N/A    Protocols used: Asthma Attack-A-[place]    Amy VILLEGAS RN  [facility] Triage Team    "

## 2024-10-18 ENCOUNTER — ANCILLARY PROCEDURE (OUTPATIENT)
Dept: GENERAL RADIOLOGY | Facility: CLINIC | Age: 44
End: 2024-10-18
Attending: PHYSICIAN ASSISTANT
Payer: COMMERCIAL

## 2024-10-18 ENCOUNTER — OFFICE VISIT (OUTPATIENT)
Dept: FAMILY MEDICINE | Facility: CLINIC | Age: 44
End: 2024-10-18
Payer: COMMERCIAL

## 2024-10-18 VITALS
OXYGEN SATURATION: 93 % | HEART RATE: 95 BPM | TEMPERATURE: 98.1 F | HEIGHT: 72 IN | DIASTOLIC BLOOD PRESSURE: 81 MMHG | BODY MASS INDEX: 37.19 KG/M2 | SYSTOLIC BLOOD PRESSURE: 115 MMHG | RESPIRATION RATE: 16 BRPM | WEIGHT: 274.6 LBS

## 2024-10-18 DIAGNOSIS — M77.12 LEFT LATERAL EPICONDYLITIS: ICD-10-CM

## 2024-10-18 DIAGNOSIS — E11.9 TYPE 2 DIABETES MELLITUS WITHOUT COMPLICATION, WITH LONG-TERM CURRENT USE OF INSULIN (H): ICD-10-CM

## 2024-10-18 DIAGNOSIS — J45.40 MODERATE PERSISTENT ASTHMA WITHOUT COMPLICATION: ICD-10-CM

## 2024-10-18 DIAGNOSIS — B37.0 THRUSH: ICD-10-CM

## 2024-10-18 DIAGNOSIS — Z79.4 TYPE 2 DIABETES MELLITUS WITHOUT COMPLICATION, WITH LONG-TERM CURRENT USE OF INSULIN (H): ICD-10-CM

## 2024-10-18 DIAGNOSIS — J06.9 VIRAL URI: Primary | ICD-10-CM

## 2024-10-18 DIAGNOSIS — J45.41 MODERATE PERSISTENT ASTHMA WITH ACUTE EXACERBATION: ICD-10-CM

## 2024-10-18 PROCEDURE — 99214 OFFICE O/P EST MOD 30 MIN: CPT | Performed by: PHYSICIAN ASSISTANT

## 2024-10-18 PROCEDURE — 71046 X-RAY EXAM CHEST 2 VIEWS: CPT | Mod: TC | Performed by: RADIOLOGY

## 2024-10-18 RX ORDER — NYSTATIN 100000 [USP'U]/ML
500000 SUSPENSION ORAL 4 TIMES DAILY
Qty: 473 ML | Refills: 0 | Status: SHIPPED | OUTPATIENT
Start: 2024-10-18

## 2024-10-18 RX ORDER — PREDNISONE 20 MG/1
20 TABLET ORAL DAILY
Qty: 5 TABLET | Refills: 0 | Status: SHIPPED | OUTPATIENT
Start: 2024-10-18

## 2024-10-18 RX ORDER — IPRATROPIUM BROMIDE AND ALBUTEROL SULFATE 2.5; .5 MG/3ML; MG/3ML
1 SOLUTION RESPIRATORY (INHALATION) EVERY 6 HOURS PRN
Qty: 90 ML | Refills: 1 | Status: SHIPPED | OUTPATIENT
Start: 2024-10-18 | End: 2024-11-04

## 2024-10-18 ASSESSMENT — ASTHMA QUESTIONNAIRES: ACT_TOTALSCORE: 9

## 2024-10-18 ASSESSMENT — PAIN SCALES - GENERAL: PAINLEVEL: NO PAIN (0)

## 2024-10-18 NOTE — PROGRESS NOTES
Assessment and Plan:     (J06.9) Viral URI  (primary encounter diagnosis)  Comment: onset one week ago, has had sore throat, congestion and cough, denies fever, out of treatment window for paxlovid, has never been vaccinated, smokes 1ppd, lungs clear today and moving air well  Plan: XR Chest 2 Views        Continue nebs and inhalers  If not better over next few days can fill and take prednisone but will need to watch BG very closely while on it  To ED if sob worsens    (M77.12) Left lateral epicondylitis  Comment: onset in last few weeks, no swelling, erythema or tenderness on exam  Plan: ice, tylenol    (J45.41) Moderate persistent asthma with acute exacerbation  Comment: on wixela, montelukast, duoneb and using all, needs refill of duoneb today, continues to smoke 1 ppd, not interested in quitting at this time   Plan: predniSONE (DELTASONE) 20 MG tablet--to use if does not improve over next few days as noted above  Recommend he get flu and covid booster which he declined today  Recommend smoking cessation     (B37.0) Thrush  Comment:   Plan: nystatin (MYCOSTATIN) [id number] UNIT/ML suspension        Rinse after inhaler    (E11.9,  Z79.4) Type 2 diabetes mellitus without complication, with long-term current use of insulin (H)  Comment: on lantus and novolog  Plan: insulin pen needle (31G X 8 MM) 31G X 8 MM         miscellaneous        Cont above    RADHA [facility] Same Day Provider   31 minutes on the day of the encounter doing chart review, history and exam, documentation and further activities as noted above.      Subjective   Dennis is a 44 year old, presenting for the following health issues:  Asthma    History of Present Illness     Asthma:  He presents for follow up of asthma.  He has some cough, some wheezing, and some shortness of breath.  He is using a relief medication daily. He does not miss any doses of his controller medication throughout the week. Patient is aware of the following  "triggers: dust mites, exercise or sports, humidity, mold, pollens, smoke and upper respiratory infections. The patient has not had a visit to the Emergency Room, Urgent Care or Hospital due to asthma since the last clinic visit.     He eats 2-3 servings of fruits and vegetables daily.He consumes 1 sweetened beverage(s) daily.He exercises with enough effort to increase his heart rate 9 or less minutes per day.  He exercises with enough effort to increase his heart rate 3 or less days per week.   He is taking medications regularly.     Dennis is here for follow-up on asthma  He has been using albuterol more frequently due to URI symptoms  His cough/cold symptoms started one week ago  He has had sore throat, right ear pain, cough/congestion  He denies fever/chills  He hasn't tested for covid  His inhalers have helped   He has been using his albuterol two times per day and duonebs 2-3 times per day  He denies chest pain, leg swelling   His cough is productive with grey sputum  He smokes about 1ppd, does not want to quit    He is not vaccinated against covid and does not want a vaccine    His BG has been running 110-130    He also notes some left-sided elbow pain  Pain is lateral   It hurts when he rests elbow on surfaces   He denies swelling/redness         Objective      /81 (BP Location: Right arm, Patient Position: Sitting, Cuff Size: Adult Large)   Pulse 95   Temp 98.1  F (36.7  C) (Oral)   Resp 16   Ht 1.816 m (5' 11.5\")   Wt 124.6 kg (274 lb 9.6 oz)   SpO2 93%   BMI 37.77 kg/m          Physical Exam     EXAM:  GENERAL APPEARANCE: healthy, alert and no distress  EYES: Eyes grossly normal to inspection  HENT: ear canals and TMs normal and nose and mouth with removable while film on tongue, oropharynx is clear without exudate or erythema, no tonsillar swelling  NECK: suppl,e no adenopathy, no asymmetry, masses  RESP: lungs clear to auscultation - no rales, rhonchi or wheezes  CV: regular rates and rhythm, " normal S1 S2, no S3 or S4 and no murmur, click or rub  EXT: no edema bilateral lower extremities         Signed Electronically by: Fabiana Mendoza PA-C

## 2024-10-18 NOTE — Clinical Note
Donnell Bustos, I'm seeing Dennis today for asthma, you had sent him [employer] message re starting chol medication and wanted to know best pharmacy, he would like it sent to [facility] on [address].  Thanks much

## 2024-10-18 NOTE — PATIENT INSTRUCTIONS
Chest x-ray today    Continue nebs and inhalers    Nystatin for thrush    Use prednisone if symptoms don't improve or if they worsen in next few days, check blood sugar three times per day while on it, stop if blood sugar is >300    Ice left elbow two times per day     Take tylenol as needed for pain up to 1000mg three times daily, do not exceed 3000mg in 24 hour period

## 2024-10-21 DIAGNOSIS — E78.2 MIXED HYPERLIPIDEMIA DUE TO TYPE 2 DIABETES MELLITUS (H): Primary | ICD-10-CM

## 2024-10-21 DIAGNOSIS — E11.69 MIXED HYPERLIPIDEMIA DUE TO TYPE 2 DIABETES MELLITUS (H): Primary | ICD-10-CM

## 2024-10-21 RX ORDER — ATORVASTATIN CALCIUM 20 MG/1
20 TABLET, FILM COATED ORAL DAILY
Qty: 90 TABLET | Refills: 0 | Status: SHIPPED | OUTPATIENT
Start: 2024-10-21

## 2024-10-21 NOTE — PROGRESS NOTES
Called and spoke with pt to relay provider note below.   Pt verbalized understanding and declined further questions at this time.     Thank you,  Tasia Fang RN

## 2024-11-03 DIAGNOSIS — J45.40 MODERATE PERSISTENT ASTHMA WITHOUT COMPLICATION: ICD-10-CM

## 2024-11-04 RX ORDER — IPRATROPIUM BROMIDE AND ALBUTEROL SULFATE 2.5; .5 MG/3ML; MG/3ML
SOLUTION RESPIRATORY (INHALATION)
Qty: 90 ML | Refills: 3 | Status: SHIPPED | OUTPATIENT
Start: 2024-11-04

## 2024-12-15 ENCOUNTER — HEALTH MAINTENANCE LETTER (OUTPATIENT)
Age: 44
End: 2024-12-15

## 2025-01-20 DIAGNOSIS — J45.41 MODERATE PERSISTENT ASTHMA WITH ACUTE EXACERBATION: ICD-10-CM

## 2025-01-20 RX ORDER — FLUTICASONE PROPIONATE AND SALMETEROL 250; 50 UG/1; UG/1
1 POWDER RESPIRATORY (INHALATION) 2 TIMES DAILY
OUTPATIENT
Start: 2025-01-20

## 2025-02-12 DIAGNOSIS — J45.41 MODERATE PERSISTENT ASTHMA WITH ACUTE EXACERBATION: ICD-10-CM

## 2025-02-12 RX ORDER — ALBUTEROL SULFATE 90 UG/1
2 INHALANT RESPIRATORY (INHALATION) EVERY 4 HOURS PRN
Qty: 18 G | Refills: 1 | Status: SHIPPED | OUTPATIENT
Start: 2025-02-12

## 2025-02-18 ENCOUNTER — E-VISIT (OUTPATIENT)
Dept: FAMILY MEDICINE | Facility: CLINIC | Age: 45
End: 2025-02-18
Payer: COMMERCIAL

## 2025-02-18 DIAGNOSIS — R51.9 NONINTRACTABLE EPISODIC HEADACHE, UNSPECIFIED HEADACHE TYPE: Primary | ICD-10-CM

## 2025-02-18 PROCEDURE — 99207 PR NON-BILLABLE SERV PER CHARTING: CPT | Performed by: PHYSICIAN ASSISTANT

## 2025-02-19 ENCOUNTER — OFFICE VISIT (OUTPATIENT)
Dept: FAMILY MEDICINE | Facility: CLINIC | Age: 45
End: 2025-02-19
Payer: COMMERCIAL

## 2025-02-19 ENCOUNTER — NURSE TRIAGE (OUTPATIENT)
Dept: FAMILY MEDICINE | Facility: CLINIC | Age: 45
End: 2025-02-19

## 2025-02-19 VITALS
HEART RATE: 98 BPM | DIASTOLIC BLOOD PRESSURE: 86 MMHG | WEIGHT: 289 LBS | BODY MASS INDEX: 39.75 KG/M2 | SYSTOLIC BLOOD PRESSURE: 118 MMHG | OXYGEN SATURATION: 96 % | RESPIRATION RATE: 19 BRPM | TEMPERATURE: 97.6 F

## 2025-02-19 DIAGNOSIS — E11.69 MIXED HYPERLIPIDEMIA DUE TO TYPE 2 DIABETES MELLITUS (H): ICD-10-CM

## 2025-02-19 DIAGNOSIS — E11.65 TYPE 2 DIABETES MELLITUS WITH HYPERGLYCEMIA, WITH LONG-TERM CURRENT USE OF INSULIN (H): ICD-10-CM

## 2025-02-19 DIAGNOSIS — Z79.4 TYPE 2 DIABETES MELLITUS WITH HYPERGLYCEMIA, WITH LONG-TERM CURRENT USE OF INSULIN (H): ICD-10-CM

## 2025-02-19 DIAGNOSIS — N52.9 ERECTILE DYSFUNCTION, UNSPECIFIED ERECTILE DYSFUNCTION TYPE: ICD-10-CM

## 2025-02-19 DIAGNOSIS — E78.2 MIXED HYPERLIPIDEMIA DUE TO TYPE 2 DIABETES MELLITUS (H): ICD-10-CM

## 2025-02-19 DIAGNOSIS — E66.812 CLASS 2 SEVERE OBESITY WITH BODY MASS INDEX (BMI) OF 35 TO 39.9 WITH SERIOUS COMORBIDITY (H): ICD-10-CM

## 2025-02-19 DIAGNOSIS — I10 BENIGN ESSENTIAL HYPERTENSION: ICD-10-CM

## 2025-02-19 DIAGNOSIS — J45.41 MODERATE PERSISTENT ASTHMA WITH ACUTE EXACERBATION: Primary | ICD-10-CM

## 2025-02-19 DIAGNOSIS — E66.01 CLASS 2 SEVERE OBESITY WITH BODY MASS INDEX (BMI) OF 35 TO 39.9 WITH SERIOUS COMORBIDITY (H): ICD-10-CM

## 2025-02-19 DIAGNOSIS — J41.0 SIMPLE CHRONIC BRONCHITIS (H): ICD-10-CM

## 2025-02-19 PROCEDURE — 99214 OFFICE O/P EST MOD 30 MIN: CPT | Performed by: INTERNAL MEDICINE

## 2025-02-19 RX ORDER — BENZONATATE 100 MG/1
100-200 CAPSULE ORAL 3 TIMES DAILY PRN
Qty: 30 CAPSULE | Refills: 0 | Status: SHIPPED | OUTPATIENT
Start: 2025-02-19

## 2025-02-19 RX ORDER — INSULIN ASPART 100 [IU]/ML
INJECTION, SOLUTION INTRAVENOUS; SUBCUTANEOUS
Qty: 30 ML | Refills: 3 | Status: SHIPPED | OUTPATIENT
Start: 2025-02-19

## 2025-02-19 RX ORDER — INSULIN GLARGINE 100 [IU]/ML
25 INJECTION, SOLUTION SUBCUTANEOUS 2 TIMES DAILY
Qty: 42 ML | Refills: 3 | Status: SHIPPED | OUTPATIENT
Start: 2025-02-19 | End: 2025-02-20

## 2025-02-19 RX ORDER — AZITHROMYCIN 250 MG/1
TABLET, FILM COATED ORAL
Qty: 6 TABLET | Refills: 0 | Status: SHIPPED | OUTPATIENT
Start: 2025-02-19

## 2025-02-19 RX ORDER — FLUTICASONE PROPIONATE AND SALMETEROL 250; 50 UG/1; UG/1
1 POWDER RESPIRATORY (INHALATION) 2 TIMES DAILY
Qty: 180 EACH | Refills: 0 | Status: SHIPPED | OUTPATIENT
Start: 2025-02-19

## 2025-02-19 RX ORDER — SILDENAFIL 50 MG/1
50 TABLET, FILM COATED ORAL DAILY PRN
Qty: 30 TABLET | Refills: 1 | Status: SHIPPED | OUTPATIENT
Start: 2025-02-19

## 2025-02-19 ASSESSMENT — ASTHMA QUESTIONNAIRES
ACT_TOTALSCORE: 15
QUESTION_5 LAST FOUR WEEKS HOW WOULD YOU RATE YOUR ASTHMA CONTROL: SOMEWHAT CONTROLLED
ACT_TOTALSCORE: 15
QUESTION_3 LAST FOUR WEEKS HOW OFTEN DID YOUR ASTHMA SYMPTOMS (WHEEZING, COUGHING, SHORTNESS OF BREATH, CHEST TIGHTNESS OR PAIN) WAKE YOU UP AT NIGHT OR EARLIER THAN USUAL IN THE MORNING: TWO OR THREE NIGHTS A WEEK
QUESTION_4 LAST FOUR WEEKS HOW OFTEN HAVE YOU USED YOUR RESCUE INHALER OR NEBULIZER MEDICATION (SUCH AS ALBUTEROL): TWO OR THREE TIMES PER WEEK
QUESTION_2 LAST FOUR WEEKS HOW OFTEN HAVE YOU HAD SHORTNESS OF BREATH: ONCE OR TWICE A WEEK
QUESTION_1 LAST FOUR WEEKS HOW MUCH OF THE TIME DID YOUR ASTHMA KEEP YOU FROM GETTING AS MUCH DONE AT WORK, SCHOOL OR AT HOME: SOME OF THE TIME

## 2025-02-19 ASSESSMENT — PAIN SCALES - GENERAL: PAINLEVEL_OUTOF10: NO PAIN (0)

## 2025-02-19 NOTE — TELEPHONE ENCOUNTER
Called the patient and triaged him. Patient was scheduled for an appointment today with available provider. He agreed with this plan and had no further questions.    Reason for Disposition   MODERATE headache (e.g., interferes with normal activities) present > 24 hours and unexplained    Additional Information   Negative: Difficult to awaken or acting confused (e.g., disoriented, slurred speech)   Negative: Weakness of the face, arm or leg on one side of the body and new-onset   Negative: Numbness of the face, arm or leg on one side of the body and new-onset   Negative: Loss of speech or garbled speech and new-onset   Negative: Passed out (e.g., fainted, lost consciousness, blacked out and was not responding)   Negative: Sounds like a life-threatening emergency to the triager   Negative: Followed a head injury within last 3 days   Negative: Traumatic Brain Injury (TBI) is suspected   Negative: Sinus pain or congestion is main symptom(s)   Negative: Influenza suspected (i.e., cough, fever, other respiratory symptoms; probable influenza exposure)   Negative: Pregnant   Negative: Unable to walk without falling   Negative: Stiff neck (can't touch chin to chest)   Negative: Other family members (or people in same household) with headaches and possibility of carbon monoxide exposure   Negative: SEVERE headache, states 'worst headache' of life   Negative: SEVERE headache, sudden-onset (i.e., reaching maximum intensity within seconds to 1 hour)   Negative: Severe pain in one eye   Negative: Loss of vision or double vision (Exception: Same as previously diagnosed migraines.)   Negative: Patient sounds very sick or weak to the triager   Negative: Fever > 103 F (39.4 C)   Negative: Fever > 100 F (37.8 C) and has diabetes mellitus or a weak immune system (e.g., HIV positive, cancer chemotherapy, organ transplant, splenectomy, chronic steroids)   Negative: New-onset headache and weak immune system (e.g., HIV positive, cancer  "chemo, splenectomy, organ transplant, chronic steroids)   Negative: Fever present > 3 days (72 hours)   Negative: Patient wants to be seen   Negative: SEVERE headache (e.g., excruciating) and has had severe headaches before   Negative: SEVERE headache and not relieved by pain meds   Negative: SEVERE headache and vomiting   Negative: SEVERE headache and fever    Answer Assessment - Initial Assessment Questions  1. LOCATION: \"Where does it hurt?\"         Back of the head    2. ONSET: \"When did the headache start?\" (e.g., minutes, hours, days)         2 months    3. PATTERN: \"Does the pain come and go, or has it been constant since it started?\"        Comes and goes    4. SEVERITY: \"How bad is the pain?\" and \"What does it keep you from doing?\"  (e.g., Scale 1-10; mild, moderate, or severe)        6-7/10    5. RECURRENT SYMPTOM: \"Have you ever had headaches before?\" If Yes, ask: \"When was the last time?\" and \"What happened that time?\"         Younger, had shots and had a cranial fracture    6. CAUSE: \"What do you think is causing the headache?\"        Unsure    7. MIGRAINE: \"Have you been diagnosed with migraine headaches?\" If Yes, ask: \"Is this headache similar?\"         No    8. HEAD INJURY: \"Has there been any recent injury to the head?\"         No    9. OTHER SYMPTOMS: \"Do you have any other symptoms?\" (e.g., fever, stiff neck, eye pain, sore throat, cold symptoms)        None    10. PREGNANCY: \"Is there any chance you are pregnant?\" \"When was your last menstrual period?\"          N/A    Protocols used: Headache-A-[place]    Amy [facility] Triage Team    "

## 2025-02-19 NOTE — TELEPHONE ENCOUNTER
Provider E-Visit time total (minutes): Referred to in person/virtual visit.  Less than 5 minutes.     Triage -- please contact patient and triage for headache symptoms. In person visit needed, unclear as to how soon at this time.

## 2025-02-19 NOTE — PATIENT INSTRUCTIONS
Thank you for choosing us for your care. I think an in-clinic or virtual visit would be the best next step based on your symptoms. My team will be reaching out to you via telephone to schedule a visit.

## 2025-02-19 NOTE — TELEPHONE ENCOUNTER
Juli Chavez [id number] minutes ago (7:10 AM)     [place]  Provider E-Visit time total (minutes): Referred to in person/virtual visit.  Less than 5 minutes.      Triage -- please contact patient and triage for headache symptoms. In person visit needed, unclear as to how soon at this time.

## 2025-02-19 NOTE — PATIENT INSTRUCTIONS
As discussed, your headache symptoms are due to metabolic causes which is the cause of your symptoms of headaches as well as erection issues.     Will send the medication for Sildenafil.     Refills for your medications taken care.     Will send Azithromycin for your Asthma flare up causing your symptoms.     ===============================

## 2025-02-19 NOTE — PROGRESS NOTES
Assessment and Plan  1. Moderate persistent asthma with acute exacerbation (Primary)  Acute exacerbation of asthma as per the ACT scores as well as ongoing bronchospastic cough.  Emphasized patient to take his inhalers regularly without missing them as well as starting on Z-Pernell as we cannot give him prednisone due to uncontrolled diabetes.  ER precautions given.  -Patient does have nebulizer at home, to continue nebulizations 3-4 times a day as needed.  Patient understands plan  - azithromycin (ZITHROMAX) 250 MG tablet; Two tablets first day, then one tablet daily for four days.  Dispense: 6 tablet; Refill: 0  - fluticasone-salmeterol (WIXELA INHUB) 250-50 MCG/ACT inhaler; Inhale 1 puff into the lungs 2 times daily.  Dispense: 180 each; Refill: 0  - benzonatate (TESSALON) 100 MG capsule; Take 1-2 capsules (100-200 mg) by mouth 3 times daily as needed for cough.  Dispense: 30 capsule; Refill: 0    2. Simple chronic bronchitis (H)  Ongoing problem with cough as patient experiences in the office today, will give cough Perles for symptomatic treatment as well as consider PFTs in upcoming physical.  - benzonatate (TESSALON) 100 MG capsule; Take 1-2 capsules (100-200 mg) by mouth 3 times daily as needed for cough.  Dispense: 30 capsule; Refill: 0    3. Type 2 diabetes mellitus with hyperglycemia, with long-term current use of insulin (H)  Uncontrolled, past 12% last year when he got diagnosed due to recurrent prednisone usage for Asthma flareups as pt states .   Currently on 50 units Glargine which I advise him to split as BID and also current sliding scale insulin of 8 units TID> Requesting for refills. Follow up with diabetic educator closely.   UPDATE - Diabetes remaining uncontrolled at 7.8% , will increase Lantus to 27 units BID.   - insulin glargine 100 UNIT/ML pen; Inject 27 Units subcutaneously 2 times daily.  Dispense: 42 mL; Refill: 3  - insulin aspart (NOVOLOG FLEXPEN) 100 UNIT/ML pen; Novolog Flexpen: 8 units  3x/day with meals  Dispense: 30 mL; Refill: 3      4. Class 2 severe obesity with body mass index (BMI) of 35 to 39.9 with serious comorbidity (H)  Understands the benefits of weight reduction on his comorbidities, patient currently at 289 pounds though given the BMI of 39.  Will consider adding GLP-1's depending on patient's A1c ordered today.    5. Erectile dysfunction, unspecified erectile dysfunction type  New problem, patient endorses that he is having severe difficulties for erection.  Requesting for medication.  Most likely due to uncontrolled diabetes, will consider as needed sildenafil with holding parameters given on the maximum dosage which patient understood and agreed the plan.  - sildenafil (VIAGRA) 50 MG tablet; Take 1 tablet (50 mg) by mouth daily as needed (30 minutes before erectile dysfunction).  Dispense: 30 tablet; Refill: 1    6. Benign essential hypertension  Well controlled, continue current Chlorthalidone.     7. Mixed hyperlipidemia due to type 2 diabetes mellitus (H)  - HEMOGLOBIN A1C; Future  - Lipid panel reflex to direct LDL Non-fasting; Future  - Albumin Random Urine Quantitative with Creat Ratio; Future  - Adult Eye  Referral; Future  - atorvastatin (LIPITOR) 40 MG tablet; Take 1 tablet (40 mg) by mouth daily.  Dispense: 90 tablet; Refill: 1           Please note that this note consists of symbols derived from keyboarding, dictation and/or voice recognition software. As a result, there may be errors in the script that have gone undetected. Please consider this when interpreting information found in this chart.    Patient Instructions   As discussed, your headache symptoms are due to metabolic causes which is the cause of your symptoms of headaches as well as erection issues.     Will send the medication for Sildenafil.     Refills for your medications taken care.     Will send Azithromycin for your Asthma flare up causing your symptoms.      ===============================          Return in about 3 months (around 5/19/2025), or if symptoms worsen or fail to improve, for If symptoms persist, Follow up of last visit, Preventative Visit.    Deborah Escobar MD  [facility] AUDIE Collins is a 44 year old, presenting for the following health issues:  Headache, Diabetes, Constipation, and Erectile Dysfunction (Believes it's from constipation which he says is worse in the last 6 months, has been taking miralax with mild relief)        2/19/2025     2:27 PM   Additional Questions   Roomed by Peri     History of Present Illness       Back Pain:  He presents for follow up of back pain. Patient's back pain is a recurring problem.  Location of back pain:  Right lower back, left lower back, right buttock, left buttock, right hip and left hip  Description of back pain: cramping, dull ache and sharp  Back pain spreads: right buttocks and left buttocks    Since patient first noticed back pain, pain is: gradually worsening  Does back pain interfere with his job:  No       Diabetes:   He presents for follow up of diabetes.  He is checking home blood glucose three times daily.   He checks blood glucose before meals.  Blood glucose is sometimes over 200 and never under 70.  When his blood glucose is low, the patient is asymptomatic for confusion, blurred vision, lethargy and reports not feeling dizzy, shaky, or weak.  He is concerned about blood sugar frequently over 200.   He is having weight gain.  The patient has had a diabetic eye exam in the last 12 months. Eye exam performed on 3/3/24 i think. Location of last eye exam [facility].        Hypertension: He presents for follow up of hypertension.  He does not check blood pressure  regularly outside of the clinic. Outpatient blood pressures have not been over 140/90. He does not follow a low salt diet.     Headaches:   Since the patient's last clinic visit, headaches are: no  "change  The patient is getting headaches:  3-5 times a week  He is able to do normal daily activities when he has a migraine.  The patient is taking the following rescue/relief medications:  Tylenol and Excedrin   Patient states \"I get no relief\" from the rescue/relief medications.   The patient is taking the following medications to prevent migraines:  No medications to prevent migraines  In the past 4 weeks, the patient has gone to an Urgent Care or Emergency Room 0 times times due to headaches.    He eats 2-3 servings of fruits and vegetables daily.He consumes 1 sweetened beverage(s) daily.He exercises with enough effort to increase his heart rate 9 or less minutes per day.    He is taking medications regularly.     Pt is new to me, requesting to take over care as his PCP Dr. Jama retired.     Pt has multiple medical conditions which are uncontrolled as per Home BG log at 170s in fasting & ongoing cough which could be underlying cause of headaches.        Allergies   Allergen Reactions    Latex Rash        Past Medical History:   Diagnosis Date    Asthma     Benign essential hypertension        Past Surgical History:   Procedure Laterality Date    BACK SURGERY      2013 ,    LEG SURGERY      for skin infection    Z OPEN REPAIR OF RECTAL FISTULA      x 5 . 2006 through 2009       Family History   Problem Relation Age of Onset    Diabetes Mother     Hyperlipidemia Father     Cerebrovascular Disease No family hx of     Coronary Artery Disease No family hx of        Social History     Tobacco Use    Smoking status: Every Day     Current packs/day: 1.00     Average packs/day: 1 pack/day for 35.1 years (35.1 ttl pk-yrs)     Types: Cigarettes     Start date: 1990    Smokeless tobacco: Never   Substance Use Topics    Alcohol use: Not on file     Comment: 1-2 drinks per week        Current Outpatient Medications   Medication Sig Dispense Refill    albuterol (PROAIR HFA/PROVENTIL HFA/VENTOLIN HFA) 108 (90 Base) MCG/ACT " inhaler INHALE 2 PUFFS INTO THE LUNGS EVERY 4 HOURS AS NEEDED FOR SHORTNESS OF BREATH OR WHEEZING 18 g 1    Alcohol Swabs (ALCOHOL PREP) PADS 1 each 4 times daily 200 each 3    atorvastatin (LIPITOR) 20 MG tablet Take 1 tablet (20 mg) by mouth daily. 90 tablet 0    azithromycin (ZITHROMAX) 250 MG tablet Two tablets first day, then one tablet daily for four days. 6 tablet 0    benzocaine-menthol (CHLORASEPTIC) 6-10 MG lozenge Place 1 lozenge inside cheek every hour as needed for sore throat 72 lozenge 0    benzonatate (TESSALON) 100 MG capsule Take 1-2 capsules (100-200 mg) by mouth 3 times daily as needed for cough. 30 capsule 0    blood glucose (NO BRAND SPECIFIED) test strip Needs Accu-Chek Guide. Use to test blood sugar 5 times daily or as directed. 200 strip 3    blood glucose monitoring (NO BRAND SPECIFIED) meter device kit Use to test blood sugar 4 times daily or as directed. 1 kit 0    blood glucose monitoring (SOFTCLIX) lancets Use to test blood sugar 4 times daily or as directed. 200 each 3    chlorthalidone (HYGROTON) 25 MG tablet TAKE 1 TABLET(25 MG) BY MOUTH DAILY 90 tablet 1    fluticasone-salmeterol (WIXELA INHUB) 250-50 MCG/ACT inhaler Inhale 1 puff into the lungs 2 times daily. 180 each 0    guaiFENesin (MUCINEX) 600 MG 12 hr tablet Take 600 mg by mouth 2 times daily.      insulin aspart (NOVOLOG FLEXPEN) 100 UNIT/ML pen Novolog Flexpen: 6 units with breakfast, 6 units with lunch, 7 units with dinner. 30 mL 3    insulin aspart (NOVOLOG PEN) 100 UNIT/ML pen Inject 1-10 Units Subcutaneous 3 times daily (before meals) Correction Scale - HIGH INSULIN RESISTANCE DOSING   Do Not give Correction Insulin if Pre-Meal BG less than 140. For Pre-Meal  - 164 give 1 unit. For Pre-Meal  - 189 give 2 units. For Pre-Meal  - 214 give 3 units. For Pre-Meal  - 239 give 4 units. For Pre-Meal  - 264 give 5 units. For Pre-Meal  - 289 give 6 units. For Pre-Meal  - 314 give 7 units.  For Pre-Meal  - 339 give 8 units. For Pre-Meal  - 364 give 9 units.  For Pre-Meal BG greater than or equal to 365 give 10 units To be given with prandial insulin, and based on pre-meal blood glucose. Administering insulin within 5 minutes of the start of the meal is ideal. Administer insulin no more than 30 minutes after the start of the meal, unless directed otherwise by provider. Notify provider if glucose greater than or equal to 350 mg/dL after administration of correction dose. 27 mL 0    insulin glargine 100 UNIT/ML pen Inject 25 Units subcutaneously 2 times daily. 42 mL 3    insulin pen needle (31G X 8 MM) 31G X 8 MM miscellaneous Use 6 pen needles daily or as directed. 200 each 3    ipratropium - albuterol 0.5 mg/2.5 mg/3 mL (DUONEB) 0.5-2.5 (3) MG/3ML neb solution INHALE 1 VIAL BY NEBULIZATION ROUTE EVERY 6 HOURS AS NEEDED FOR SHORTNESS OF BREATH, WHEEZING OR COUGH 90 mL 3    lisinopril (ZESTRIL) 40 MG tablet TAKE 1 TABLET(40 MG) BY MOUTH DAILY 90 tablet 1    montelukast (SINGULAIR) 10 MG tablet Take 1 tablet (10 mg) by mouth at bedtime. 90 tablet 1    nicotine (NICODERM CQ) 14 MG/24HR 24 hr patch Place 1 patch onto the skin daily 30 patch 0    nystatin (MYCOSTATIN) 559176 UNIT/ML suspension Take 5 mLs (500,000 Units) by mouth 4 times daily. 473 mL 0    omeprazole-sodium bicarbonate (ZEGERID)  MG capsule Take 1 capsule (40 mg) by mouth every morning (before breakfast)      sildenafil (VIAGRA) 50 MG tablet Take 1 tablet (50 mg) by mouth daily as needed (30 minutes before erectile dysfunction). 30 tablet 1    thin (NO BRAND SPECIFIED) lancets Use with lanceting device. 200 each 6     No current facility-administered medications for this visit.          Review of Systems  Constitutional, HEENT, cardiovascular, pulmonary, GI, , musculoskeletal, neuro, skin, endocrine and psych systems are negative, except as otherwise noted.      Objective    /86   Pulse 98   Temp 97.6  F (36.4  C)  (Tympanic)   Resp 19   Wt 131.1 kg (289 lb)   SpO2 96%   BMI 39.75 kg/m    Body mass index is 39.75 kg/m .  Physical Exam   GENERAL: alert and no distress  NECK: no adenopathy, no asymmetry, masses, or scars  RESP: lungs clear to auscultation - no rales, rhonchi or wheezes  CV: regular rate and rhythm, normal S1 S2, no S3 or S4, no murmur, click or rub, no peripheral edema  ABDOMEN: soft, nontender, no hepatosplenomegaly, no masses and bowel sounds normal  MS: no gross musculoskeletal defects noted, no edema       Signed Electronically by: Deborah Escobar MD

## 2025-02-20 ENCOUNTER — LAB (OUTPATIENT)
Dept: LAB | Facility: CLINIC | Age: 45
End: 2025-02-20
Payer: COMMERCIAL

## 2025-02-20 DIAGNOSIS — E11.65 TYPE 2 DIABETES MELLITUS WITH HYPERGLYCEMIA, WITH LONG-TERM CURRENT USE OF INSULIN (H): ICD-10-CM

## 2025-02-20 DIAGNOSIS — E78.2 MIXED HYPERLIPIDEMIA DUE TO TYPE 2 DIABETES MELLITUS (H): ICD-10-CM

## 2025-02-20 DIAGNOSIS — E11.69 MIXED HYPERLIPIDEMIA DUE TO TYPE 2 DIABETES MELLITUS (H): ICD-10-CM

## 2025-02-20 DIAGNOSIS — Z79.4 TYPE 2 DIABETES MELLITUS WITH HYPERGLYCEMIA, WITH LONG-TERM CURRENT USE OF INSULIN (H): ICD-10-CM

## 2025-02-20 LAB
ALBUMIN SERPL BCG-MCNC: 4.4 G/DL (ref 3.5–5.2)
ALP SERPL-CCNC: 69 U/L (ref 40–150)
ALT SERPL W P-5'-P-CCNC: 35 U/L (ref 0–70)
ANION GAP SERPL CALCULATED.3IONS-SCNC: 14 MMOL/L (ref 7–15)
AST SERPL W P-5'-P-CCNC: 23 U/L (ref 0–45)
BILIRUB SERPL-MCNC: 0.5 MG/DL
BUN SERPL-MCNC: 23 MG/DL (ref 6–20)
CALCIUM SERPL-MCNC: 9.6 MG/DL (ref 8.8–10.4)
CHLORIDE SERPL-SCNC: 100 MMOL/L (ref 98–107)
CHOLEST SERPL-MCNC: 228 MG/DL
CREAT SERPL-MCNC: 1.02 MG/DL (ref 0.67–1.17)
CREAT UR-MCNC: 226 MG/DL
EGFRCR SERPLBLD CKD-EPI 2021: >90 ML/MIN/1.73M2
EST. AVERAGE GLUCOSE BLD GHB EST-MCNC: 163 MG/DL
FASTING STATUS PATIENT QL REPORTED: YES
FASTING STATUS PATIENT QL REPORTED: YES
GLUCOSE SERPL-MCNC: 173 MG/DL (ref 70–99)
HBA1C MFR BLD: 7.3 % (ref 0–5.6)
HCO3 SERPL-SCNC: 24 MMOL/L (ref 22–29)
HDLC SERPL-MCNC: 34 MG/DL
LDLC SERPL CALC-MCNC: 129 MG/DL
MICROALBUMIN UR-MCNC: 69.8 MG/L
MICROALBUMIN/CREAT UR: 30.88 MG/G CR (ref 0–17)
NONHDLC SERPL-MCNC: 194 MG/DL
POTASSIUM SERPL-SCNC: 4.1 MMOL/L (ref 3.4–5.3)
PROT SERPL-MCNC: 7.2 G/DL (ref 6.4–8.3)
SODIUM SERPL-SCNC: 138 MMOL/L (ref 135–145)
TRIGL SERPL-MCNC: 326 MG/DL

## 2025-02-20 RX ORDER — INSULIN GLARGINE 100 [IU]/ML
27 INJECTION, SOLUTION SUBCUTANEOUS 2 TIMES DAILY
Qty: 42 ML | Refills: 3 | Status: SHIPPED | OUTPATIENT
Start: 2025-02-20

## 2025-02-20 RX ORDER — ATORVASTATIN CALCIUM 40 MG/1
40 TABLET, FILM COATED ORAL DAILY
Qty: 90 TABLET | Refills: 1 | Status: SHIPPED | OUTPATIENT
Start: 2025-02-20

## 2025-02-24 DIAGNOSIS — J30.2 SEASONAL ALLERGIC RHINITIS, UNSPECIFIED TRIGGER: ICD-10-CM

## 2025-02-24 DIAGNOSIS — I10 BENIGN ESSENTIAL HYPERTENSION: ICD-10-CM

## 2025-02-24 DIAGNOSIS — J45.40 MODERATE PERSISTENT ASTHMA WITHOUT COMPLICATION: ICD-10-CM

## 2025-02-24 RX ORDER — MONTELUKAST SODIUM 10 MG/1
1 TABLET ORAL AT BEDTIME
Qty: 90 TABLET | Refills: 3 | Status: SHIPPED | OUTPATIENT
Start: 2025-02-24

## 2025-02-25 RX ORDER — CHLORTHALIDONE 25 MG/1
25 TABLET ORAL DAILY
Qty: 90 TABLET | Refills: 0 | Status: SHIPPED | OUTPATIENT
Start: 2025-02-25

## 2025-02-25 RX ORDER — LISINOPRIL 40 MG/1
40 TABLET ORAL DAILY
Qty: 90 TABLET | Refills: 0 | Status: SHIPPED | OUTPATIENT
Start: 2025-02-25

## 2025-02-26 NOTE — TELEPHONE ENCOUNTER
Refill done.    Future refills after annual physical due in 5/19 /25 - Please assist pt to schedule.    Thank you,  Deborah Escobar MD on 2/25/2025

## 2025-03-06 ENCOUNTER — E-VISIT (OUTPATIENT)
Dept: FAMILY MEDICINE | Facility: CLINIC | Age: 45
End: 2025-03-06
Payer: COMMERCIAL

## 2025-03-06 DIAGNOSIS — J01.90 ACUTE SINUSITIS WITH SYMPTOMS > 10 DAYS: ICD-10-CM

## 2025-03-06 DIAGNOSIS — J02.9 PHARYNGITIS, UNSPECIFIED ETIOLOGY: ICD-10-CM

## 2025-03-06 DIAGNOSIS — H69.90 DYSFUNCTION OF EUSTACHIAN TUBE, UNSPECIFIED LATERALITY: ICD-10-CM

## 2025-03-06 DIAGNOSIS — J45.41 MODERATE PERSISTENT ASTHMA WITH ACUTE EXACERBATION: Primary | ICD-10-CM

## 2025-03-06 RX ORDER — AZITHROMYCIN 250 MG/1
TABLET, FILM COATED ORAL
Qty: 6 TABLET | Refills: 0 | Status: SHIPPED | OUTPATIENT
Start: 2025-03-06

## 2025-03-06 RX ORDER — PREDNISONE 10 MG/1
TABLET ORAL
Qty: 20 TABLET | Refills: 0 | Status: SHIPPED | OUTPATIENT
Start: 2025-03-06

## 2025-03-06 NOTE — TELEPHONE ENCOUNTER
Provider E-Visit time total (minutes): 11 minutes       Donnell Collins ,     I am sorry to hear that. Given your symptoms which we have been familiar in the past too and treated as asthma exacerbation on 2/16 but we were unable to give you prednisone due to your uncontrolled diabetes.      Given that you are still having ongoing symptoms with swollen glands and ears having the symptoms of blocking sensation it is a good idea to give a small course of prednisone as well with better control of your carbohydrate intake so you do not develop any worsening symptoms of diabetes.  Sent in another Z-Pernell as requested as well as short taper of prednisone.    Again we are unable to give 2 Z-Pernell's at same time which you are requesting since it is not the standard guidelines.    Please let me know if you have any questions.     Thanks & Regards,  Deborah Escobar MD on 3/6/2025 at 6:34 PM

## 2025-03-07 NOTE — PATIENT INSTRUCTIONS
Thank you for choosing us for your care. I have placed an order for a prescription so that you can start treatment:  Orders Placed This Encounter   Medications     azithromycin (ZITHROMAX) 250 MG tablet     Sig: Two tablets first day, then one tablet daily for four days.     Dispense:  6 tablet     Refill:  0     predniSONE (DELTASONE) 10 MG tablet     Si tabs daily for 2 days, then 3 tabs daily for 2 days, then 2 tabs daily for 2 days, then 1 tab daily for 2 days, then stop     Dispense:  20 tablet     Refill:  0        View your full visit summary for details by clicking on the link below. Your pharmacist will able to address any questions you may have about the medication.     If you're not feeling better within 5-7 days, please schedule an appointment.  You can schedule an appointment right here in [facility], or call [phone]  If the visit is for the same symptoms as your eVisit, we'll refund the cost of your eVisit if seen within seven days.

## 2025-05-14 DIAGNOSIS — E11.9 TYPE 2 DIABETES MELLITUS (H): ICD-10-CM

## 2025-05-15 RX ORDER — BLOOD SUGAR DIAGNOSTIC
STRIP MISCELLANEOUS
Qty: 200 STRIP | Refills: 3 | Status: SHIPPED | OUTPATIENT
Start: 2025-05-15

## 2025-05-20 ENCOUNTER — TELEPHONE (OUTPATIENT)
Dept: FAMILY MEDICINE | Facility: CLINIC | Age: 45
End: 2025-05-20
Payer: COMMERCIAL

## 2025-05-20 DIAGNOSIS — J45.41 MODERATE PERSISTENT ASTHMA WITH ACUTE EXACERBATION: ICD-10-CM

## 2025-05-20 RX ORDER — ALBUTEROL SULFATE 90 UG/1
2 INHALANT RESPIRATORY (INHALATION) EVERY 4 HOURS PRN
Qty: 18 G | Refills: 1 | Status: CANCELLED | OUTPATIENT
Start: 2025-05-20

## 2025-05-20 RX ORDER — FLUTICASONE PROPIONATE AND SALMETEROL 250; 50 UG/1; UG/1
1 POWDER RESPIRATORY (INHALATION) 2 TIMES DAILY
Qty: 180 EACH | Refills: 0 | Status: CANCELLED | OUTPATIENT
Start: 2025-05-20

## 2025-05-20 NOTE — TELEPHONE ENCOUNTER
Reason for Call:  Appointment Request    Patient requesting this type of appt:  Office visit    Requested provider: Dr. Escobar    When does patient want to be seen/preferred time: 1-2 days    Comments: None    Call taken on 5/20/2025 at 5:12 PM by Zaynab Quiroga RN

## 2025-05-22 ENCOUNTER — MYC MEDICAL ADVICE (OUTPATIENT)
Dept: FAMILY MEDICINE | Facility: CLINIC | Age: 45
End: 2025-05-22

## 2025-05-22 ENCOUNTER — ANCILLARY PROCEDURE (OUTPATIENT)
Dept: GENERAL RADIOLOGY | Facility: CLINIC | Age: 45
End: 2025-05-22
Attending: INTERNAL MEDICINE
Payer: COMMERCIAL

## 2025-05-22 ENCOUNTER — OFFICE VISIT (OUTPATIENT)
Dept: FAMILY MEDICINE | Facility: CLINIC | Age: 45
End: 2025-05-22
Payer: COMMERCIAL

## 2025-05-22 VITALS
BODY MASS INDEX: 38.6 KG/M2 | WEIGHT: 285 LBS | HEIGHT: 72 IN | RESPIRATION RATE: 20 BRPM | DIASTOLIC BLOOD PRESSURE: 80 MMHG | HEART RATE: 93 BPM | TEMPERATURE: 97.2 F | SYSTOLIC BLOOD PRESSURE: 118 MMHG | OXYGEN SATURATION: 95 %

## 2025-05-22 DIAGNOSIS — E66.812 CLASS 2 SEVERE OBESITY WITH BODY MASS INDEX (BMI) OF 35 TO 39.9 WITH SERIOUS COMORBIDITY (H): ICD-10-CM

## 2025-05-22 DIAGNOSIS — E66.01 CLASS 2 SEVERE OBESITY WITH BODY MASS INDEX (BMI) OF 35 TO 39.9 WITH SERIOUS COMORBIDITY (H): ICD-10-CM

## 2025-05-22 DIAGNOSIS — J20.9 ACUTE BRONCHITIS WITH SYMPTOMS > 10 DAYS: ICD-10-CM

## 2025-05-22 DIAGNOSIS — Z12.11 SCREEN FOR COLON CANCER: ICD-10-CM

## 2025-05-22 DIAGNOSIS — K21.9 GASTROESOPHAGEAL REFLUX DISEASE WITHOUT ESOPHAGITIS: ICD-10-CM

## 2025-05-22 DIAGNOSIS — F17.200 NICOTINE DEPENDENCE, UNCOMPLICATED, UNSPECIFIED NICOTINE PRODUCT TYPE: ICD-10-CM

## 2025-05-22 DIAGNOSIS — G44.219 EPISODIC TENSION-TYPE HEADACHE, NOT INTRACTABLE: ICD-10-CM

## 2025-05-22 DIAGNOSIS — E11.69 MIXED HYPERLIPIDEMIA DUE TO TYPE 2 DIABETES MELLITUS (H): ICD-10-CM

## 2025-05-22 DIAGNOSIS — I10 BENIGN ESSENTIAL HYPERTENSION: ICD-10-CM

## 2025-05-22 DIAGNOSIS — Z79.4 TYPE 2 DIABETES MELLITUS WITH OTHER SPECIFIED COMPLICATION, WITH LONG-TERM CURRENT USE OF INSULIN (H): ICD-10-CM

## 2025-05-22 DIAGNOSIS — Z00.00 ROUTINE GENERAL MEDICAL EXAMINATION AT A HEALTH CARE FACILITY: Primary | ICD-10-CM

## 2025-05-22 DIAGNOSIS — Z98.890 HISTORY OF LUMBOSACRAL SPINE SURGERY: ICD-10-CM

## 2025-05-22 DIAGNOSIS — M54.50 BILATERAL LOW BACK PAIN WITHOUT SCIATICA, UNSPECIFIED CHRONICITY: ICD-10-CM

## 2025-05-22 DIAGNOSIS — E11.69 TYPE 2 DIABETES MELLITUS WITH OTHER SPECIFIED COMPLICATION, WITH LONG-TERM CURRENT USE OF INSULIN (H): ICD-10-CM

## 2025-05-22 DIAGNOSIS — J45.41 MODERATE PERSISTENT ASTHMA WITH ACUTE EXACERBATION: ICD-10-CM

## 2025-05-22 DIAGNOSIS — E78.2 MIXED HYPERLIPIDEMIA DUE TO TYPE 2 DIABETES MELLITUS (H): ICD-10-CM

## 2025-05-22 LAB
EST. AVERAGE GLUCOSE BLD GHB EST-MCNC: 163 MG/DL
HBA1C MFR BLD: 7.3 % (ref 0–5.6)

## 2025-05-22 RX ORDER — BUDESONIDE AND FORMOTEROL FUMARATE DIHYDRATE 80; 4.5 UG/1; UG/1
2 AEROSOL RESPIRATORY (INHALATION) 2 TIMES DAILY
Qty: 10.2 G | Refills: 1 | Status: SHIPPED | OUTPATIENT
Start: 2025-05-22

## 2025-05-22 RX ORDER — SUMATRIPTAN SUCCINATE 25 MG/1
25 TABLET ORAL
Qty: 30 TABLET | Refills: 1 | Status: SHIPPED | OUTPATIENT
Start: 2025-05-22

## 2025-05-22 RX ORDER — ALBUTEROL SULFATE 90 UG/1
2 INHALANT RESPIRATORY (INHALATION) EVERY 4 HOURS PRN
Qty: 18 G | Refills: 1 | Status: SHIPPED | OUTPATIENT
Start: 2025-05-22

## 2025-05-22 SDOH — HEALTH STABILITY: PHYSICAL HEALTH: ON AVERAGE, HOW MANY DAYS PER WEEK DO YOU ENGAGE IN MODERATE TO STRENUOUS EXERCISE (LIKE A BRISK WALK)?: 5 DAYS

## 2025-05-22 SDOH — HEALTH STABILITY: PHYSICAL HEALTH: ON AVERAGE, HOW MANY MINUTES DO YOU ENGAGE IN EXERCISE AT THIS LEVEL?: 30 MIN

## 2025-05-22 ASSESSMENT — ASTHMA QUESTIONNAIRES: ACT_TOTALSCORE: 14

## 2025-05-22 ASSESSMENT — SOCIAL DETERMINANTS OF HEALTH (SDOH): HOW OFTEN DO YOU GET TOGETHER WITH FRIENDS OR RELATIVES?: ONCE A WEEK

## 2025-05-22 ASSESSMENT — PAIN SCALES - GENERAL: PAINLEVEL_OUTOF10: MILD PAIN (3)

## 2025-05-22 NOTE — PROGRESS NOTES
Preventive Care Visit  [facility] AUDIE Escobar MD, Internal Medicine  May 22, 2025        Assessment and Plan  1. Routine general medical examination at a health care facility (Primary)    Last seen patient in February 2025 for asthma exacerbation at that time, he is here for annual physical.  Patient A1c was 7.8% on the recent check for which his insulin was adjusted to 27 units twice daily, also on sliding scale.    Patient does have multiple new concerns and acute flareups of his back pain, asthma, ongoing headaches which all will be taken care of this physical..      - HEMOGLOBIN A1C; Future  - Lipid panel reflex to direct LDL Non-fasting; Future  - Colonoscopy Screening  Referral; Future  - PRIMARY CARE FOLLOW-UP SCHEDULING; Future  - albuterol (PROAIR HFA/PROVENTIL HFA/VENTOLIN HFA) 108 (90 Base) MCG/ACT inhaler; Inhale 2 puffs into the lungs every 4 hours as needed for shortness of breath or wheezing.  Dispense: 18 g; Refill: 1  - TSH with free T4 reflex; Future  - Vitamin B12; Future  - Iron and iron binding capacity; Future  - Adult Diabetes Education  Referral; Future  - Adult Eye  Referral; Future  - FOOT EXAM  - HEMOGLOBIN A1C  - Lipid panel reflex to direct LDL Non-fasting  - TSH with free T4 reflex  - Vitamin B12  - Iron and iron binding capacity  - SMOKING CESSATION COUNSELING 3-10 MIN    2. Type 2 diabetes mellitus with other specified complication, with long-term current use of insulin (H)  3. Mixed hyperlipidemia due to type 2 diabetes mellitus (H)  Chronic problem, remaining stable at A1c 7.3% as seen in the past in the office visit today.  Continue current medications as mentioned above.   - HEMOGLOBIN A1C; Future  - Lipid panel reflex to direct LDL Non-fasting; Future  - Adult Diabetes Education  Referral; Future  - Adult Eye  Referral; Future  - FOOT EXAM  - HEMOGLOBIN A1C  - Lipid panel reflex to direct LDL  Non-fasting    4. Class 2 severe obesity with body mass index (BMI) of 35 to 39.9 with serious comorbidity (H)  Patient working on diet and lifestyle modifications which I emphasized to continue given his aforementioned comorbidities which will be improved with weight reduction.    5. Moderate persistent asthma with acute exacerbation  6. Acute bronchitis with symptoms > 10 days  Chronic problem, acute flareup of asthma exacerbation again at this time.  Physical exam positive for bibasal crackles as well as wheezing heard.  Unable to give prednisone given the uncontrolled diabetes, will consider checking for chest x-ray at this time as well as sent in a course of antibiotic and started on Symbicort.  Patient understood the plan.  - albuterol (PROAIR HFA/PROVENTIL HFA/VENTOLIN HFA) 108 (90 Base) MCG/ACT inhaler; Inhale 2 puffs into the lungs every 4 hours as needed for shortness of breath or wheezing.  Dispense: 18 g; Refill: 1  - XR Chest 2 Views; Future  - amoxicillin-clavulanate (AUGMENTIN) 875-125 MG tablet; Take 1 tablet by mouth 2 times daily.  Dispense: 14 tablet; Refill: 0  - budesonide-formoterol (SYMBICORT/BREYNA) 80-4.5 MCG/ACT inhaler; Inhale 2 puffs into the lungs 2 times daily.  Dispense: 10.2 g; Refill: 1      7. Benign essential hypertension  Chronic problem, well-controlled on current chlorthalidone 25 mg daily, lisinopril 40 mg daily.    8. Gastroesophageal reflux disease without esophagitis  Chronic stable, continue current omeprazole which is working well for him.    9. Bilateral low back pain without sciatica, unspecified chronicity  10. History of lumbosacral spine surgery  Chronic problem, uncontrolled.  Patient endorses that his current flareup of back pain which he has been facing is 6/10 in severity, it is present bilaterally on the low back but no radiation or sciatica.  Shared decision for a muscle relaxer and physical therapy after checking for x-ray of the lumbar spine.  I do not see any  imaging in epic.  Patient does endorses remote history of sacral lumbar spinal fusion in 2013.  -Also shared decision for a spine specialist referral for further recommendations.  Patient understand the plan.  - tiZANidine (ZANAFLEX) 4 MG tablet; Take 1 tablet (4 mg) by mouth nightly as needed for muscle spasms.  Dispense: 30 tablet; Refill: 1  - XR Lumbar Spine 2/3 Views; Future  - Spine  Referral; Future  - Physical Therapy  Referral; Future    11. Episodic tension-type headache, not intractable  Ongoing problem, uncontrolled.  Patient endorses that this Mostly on the left parietal region , 6/10 severity which stays for 1/2 a day and pounding sensation.  Blood pressure is remaining normal, patient has tried and failed over-the-counter naproxen and Aleve.  Will give Imitrex for improvement.  Patient to follow-up if no improvement for possible need of imaging of the head.  Patient understood the plan.  - SUMAtriptan (IMITREX) 25 MG tablet; Take 1 tablet (25 mg) by mouth at onset of headache for migraine. May repeat in 2 hours. Max 8 tablets/24 hours.  Dispense: 30 tablet; Refill: 1  - TSH with free T4 reflex; Future  - Vitamin B12; Future  - Iron and iron binding capacity; Future  - TSH with free T4 reflex  - Vitamin B12  - Iron and iron binding capacity    12. Nicotine dependence, uncomplicated, unspecified nicotine product type  Patient was counseled on smoking cessation, we discussed the benefits of quitting this took 3 minutes duration..Also encouraged to set a stop date.  This took 3 minutes duration.  - SMOKING CESSATION COUNSELING 3-10 MIN    13. Screen for colon cancer  - Colonoscopy Screening  Referral; Future       The longitudinal plan of care for the diagnosis(es)/condition(s) as documented were addressed during this visit. Due to the added complexity in care, I will continue to support Dennis in the subsequent management and with ongoing continuity of care.      Please note that  this note consists of symbols derived from keyboarding, dictation and/or voice recognition software. As a result, there may be errors in the script that have gone undetected. Please consider this when interpreting information found in this chart.    Patient Instructions   As discussed, please do fasting labs placed    Refills will be taken care     Referral to spine specialist and Muscle relaxor and physical therapy    Will give Augmentin and Symbicort inhaler for your asthma / COPD and check X ray     Started on Imitrex for your headaches    Placed referral to diabetic educator    ======================    Patient Education  Preventive Care Advice   This is general advice given by our system to help you stay healthy. However, your care team may have specific advice just for you. Please talk to your care team about your preventive care needs.  Nutrition  Eat 5 or more servings of fruits and vegetables each day.  Try wheat bread, brown rice and whole grain pasta (instead of white bread, rice, and pasta).  Get enough calcium and vitamin D. Check the label on foods and aim for 100% of the RDA (recommended daily allowance).  Lifestyle  Exercise at least 150 minutes each week  (30 minutes a day, 5 days a week).  Do muscle strengthening activities 2 days a week. These help control your weight and prevent disease.  No smoking.  Wear sunscreen to prevent skin cancer.  Have a dental exam and cleaning every 6 months.  Yearly exams  See your health care team every year to talk about:  Any changes in your health.  Any medicines your care team has prescribed.  Preventive care, family planning, and ways to prevent chronic diseases.  Shots (vaccines)   HPV shots (up to age 26), if you've never had them before.  Hepatitis B shots (up to age 59), if you've never had them before.  COVID-19 shot: Get this shot when it's due.  Flu shot: Get a flu shot every year.  Tetanus shot: Get a tetanus shot every 10 years.  Pneumococcal,  hepatitis A, and RSV shots: Ask your care team if you need these based on your risk.  Shingles shot (for age 50 and up)  General health tests  Diabetes screening:  Starting at age 35, Get screened for diabetes at least every 3 years.  If you are younger than age 35, ask your care team if you should be screened for diabetes.  Cholesterol test: At age 39, start having a cholesterol test every 5 years, or more often if advised.  Bone density scan (DEXA): At age 50, ask your care team if you should have this scan for osteoporosis (brittle bones).  Hepatitis C: Get tested at least once in your life.  STIs (sexually transmitted infections)  Before age 24: Ask your care team if you should be screened for STIs.  After age 24: Get screened for STIs if you're at risk. You are at risk for STIs (including HIV) if:  You are sexually active with more than one person.  You don't use condoms every time.  You or a partner was diagnosed with a sexually transmitted infection.  If you are at risk for HIV, ask about PrEP medicine to prevent HIV.  Get tested for HIV at least once in your life, whether you are at risk for HIV or not.  Cancer screening tests  Cervical cancer screening: If you have a cervix, begin getting regular cervical cancer screening tests starting at age 21.  Breast cancer scan (mammogram): If you've ever had breasts, begin having regular mammograms starting at age 40. This is a scan to check for breast cancer.  Colon cancer screening: It is important to start screening for colon cancer at age 45.  Have a colonoscopy test every 10 years (or more often if you're at risk) Or, ask your provider about stool tests like a FIT test every year or Cologuard test every 3 years.  To learn more about your testing options, visit:   .  For help making a decision, visit:   https://bit.ly/fu32970.  Prostate cancer screening test: If you have a prostate, ask your care team if a prostate cancer screening test (PSA) at age 55 is right  for you.  Lung cancer screening: If you are a current or former smoker ages 50 to 80, ask your care team if ongoing lung cancer screenings are right for you.  For informational purposes only. Not to replace the advice of your health care provider. Copyright   2023 [employer]. All rights reserved. Clinically reviewed by the [facility] Transitions Program. [employer] [id number] - REV 01/24.     Return in about 3 months (around 8/22/2025), or if symptoms worsen or fail to improve, for diabetes, If symptoms persist, video visit.    Deborah Escobar MD  [facility] AUDIE Collins is a 45 year old, presenting for the following:  Recheck Medication        5/22/2025    10:35 AM   Additional Questions   Roomed by Peri          History of Present Illness       Back Pain:  He presents for follow up of back pain. Patient's back pain is a recurring problem.  Location of back pain:  Right lower back, left lower back, right side of neck, left side of neck, right buttock, left buttock, right hip, left hip, right side of waist and left side of waist  Description of back pain: cramping, dull ache, fullness and sharp  Back pain spreads: right buttocks, left buttocks, right thigh and left thigh    Since patient first noticed back pain, pain is: always present, but gets better and worse  Does back pain interfere with his job:  Not applicable       Diabetes:   He presents for follow up of diabetes.  He is checking home blood glucose two times daily.   He checks blood glucose before meals, after meals and at bedtime.  Blood glucose is sometimes over 200 and never under 70. He is aware of hypoglycemia symptoms including weakness.   He is concerned about blood sugar frequently over 200.   He is having weight gain.  The patient has not had a diabetic eye exam in the last 12 months.          Hyperlipidemia:  He presents for follow up of hyperlipidemia.   He is not taking medication to  "lower cholesterol. He is having myalgia or other side effects to statin medications.    Hypertension: He presents for follow up of hypertension.  He does not check blood pressure  regularly outside of the clinic. Outpatient blood pressures have not been over 140/90. He does not follow a low salt diet.     Headaches:   Since the patient's last clinic visit, headaches are: no change  The patient is getting headaches:  [id number] a week  He is able to do normal daily activities when he has a migraine.  The patient is taking the following rescue/relief medications:  Naproxyn (Aleve) and Tylenol   Patient states \"The relief is inconsistent\" from the rescue/relief medications.   The patient is taking the following medications to prevent migraines:  No medications to prevent migraines  In the past 4 weeks, the patient has gone to an Urgent Care or Emergency Room 0 times times due to headaches.    Reason for visit:  Med refills phy    He eats 2-3 servings of fruits and vegetables daily.He consumes 0 sweetened beverage(s) daily.He exercises with enough effort to increase his heart rate 20 to 29 minutes per day.  He exercises with enough effort to increase his heart rate 5 days per week.   He is taking medications regularly.      Advance Care Planning    Discussed advance care planning with patient; however, patient declined at this time.        5/22/2025   General Health   How would you rate your overall physical health? (!) FAIR   Feel stress (tense, anxious, or unable to sleep) Only a little   (!) STRESS CONCERN      5/22/2025   Nutrition   Three or more servings of calcium each day? Yes   Diet: Diabetic   How many servings of fruit and vegetables per day? (!) 2-3   How many sweetened beverages each day? 0-1         5/22/2025   Exercise   Days per week of moderate/strenous exercise 5 days   Average minutes spent exercising at this level 30 min         5/22/2025   Social Factors   Frequency of gathering with friends or " relatives Once a week   Worry food won't last until get money to buy more No   Food not last or not have enough money for food? No   Do you have housing? (Housing is defined as stable permanent housing and does not include staying outside in a car, in a tent, in an abandoned building, in an overnight shelter, or couch-surfing.) Yes   Are you worried about losing your housing? No   Lack of transportation? No   Unable to get utilities (heat,electricity)? No         5/22/2025   Dental   Dentist two times every year? Yes           Today's PHQ-2 Score:       2/19/2025     2:10 PM   PHQ-2 ( 1999 [employer])   Q1: Little interest or pleasure in doing things 0   Q2: Feeling down, depressed or hopeless 0   PHQ-2 Score 0    Q1: Little interest or pleasure in doing things Not at all   Q2: Feeling down, depressed or hopeless Not at all   PHQ-2 Score 0       Patient-reported         5/22/2025   Substance Use   Alcohol more than 3/day or more than 7/wk No   Do you use any other substances recreationally? No     Social History     Tobacco Use    Smoking status: Every Day     Current packs/day: 1.00     Average packs/day: 1 pack/day for 35.4 years (35.4 ttl pk-yrs)     Types: Cigarettes     Start date: 1990    Smokeless tobacco: Never    Tobacco comments:     Started at 11 yrs age - 1 PPD >. Currently 1 PPD.    Substance Use Topics    Drug use: Not Currently           5/22/2025   STI Screening   New sexual partner(s) since last STI/HIV test? No   ASCVD Risk   The 10-year ASCVD risk score (Letty BUTTS, et al., 2019) is: 21.8%    Values used to calculate the score:      Age: 45 years      Sex: Male      Is Non- : No      Diabetic: Yes      Tobacco smoker: Yes      Systolic Blood Pressure: 118 mmHg      Is BP treated: Yes      HDL Cholesterol: 34 mg/dL      Total Cholesterol: 228 mg/dL        5/22/2025   Contraception/Family Planning   Questions about contraception or family planning No        Reviewed and  updated as needed this visit by Provider   Tobacco  Allergies  Meds  Problems  Med Hx  Surg Hx  Fam Hx            Past Medical History:   Diagnosis Date    Asthma     Benign essential hypertension      Past Surgical History:   Procedure Laterality Date    BACK SURGERY      2013 ,    LEG SURGERY      for skin infection    ZZC OPEN REPAIR OF RECTAL FISTULA      x 5 . 2006 through 2009     Lab work is in process  Labs reviewed in EPIC  BP Readings from Last 3 Encounters:   05/22/25 118/80   02/19/25 118/86   10/18/24 115/81    Wt Readings from Last 3 Encounters:   05/22/25 129.3 kg (285 lb)   02/19/25 131.1 kg (289 lb)   10/18/24 124.6 kg (274 lb 9.6 oz)                  Patient Active Problem List   Diagnosis    Pseudohyponatremia    Benign essential hypertension    Gastroesophageal reflux disease without esophagitis    Seasonal allergic rhinitis, unspecified trigger    Moderate persistent asthma with acute exacerbation    Type 2 diabetes mellitus (H)    Bilateral low back pain without sciatica, unspecified chronicity    Mixed hyperlipidemia due to type 2 diabetes mellitus (H)    Encounter for smoking cessation counseling    Simple chronic bronchitis (H)    Asthma    Allergic bronchitis    Class 2 severe obesity with body mass index (BMI) of 35 to 39.9 with serious comorbidity (H)    History of lumbosacral spine surgery     Past Surgical History:   Procedure Laterality Date    BACK SURGERY      2013 ,    LEG SURGERY      for skin infection    ZZC OPEN REPAIR OF RECTAL FISTULA      x 5 . 2006 through 2009       Social History     Tobacco Use    Smoking status: Every Day     Current packs/day: 1.00     Average packs/day: 1 pack/day for 35.4 years (35.4 ttl pk-yrs)     Types: Cigarettes     Start date: 1990    Smokeless tobacco: Never    Tobacco comments:     Started at 11 yrs age - 1 PPD >. Currently 1 PPD.    Substance Use Topics    Alcohol use: Not on file     Comment: 1-2 drinks per week     Family History    Problem Relation Age of Onset    Diabetes Mother     Hyperlipidemia Father     Cerebrovascular Disease No family hx of     Coronary Artery Disease No family hx of          Current Outpatient Medications   Medication Sig Dispense Refill    ACCU-CHEK GUIDE TEST test strip USE TO TEST BLOOD SUGAR 5 TIMES DAILY OR AS DIRECTED 200 strip 3    albuterol (PROAIR HFA/PROVENTIL HFA/VENTOLIN HFA) 108 (90 Base) MCG/ACT inhaler Inhale 2 puffs into the lungs every 4 hours as needed for shortness of breath or wheezing. 18 g 1    Alcohol Swabs (ALCOHOL PREP) PADS 1 each 4 times daily 200 each 3    amoxicillin-clavulanate (AUGMENTIN) 875-125 MG tablet Take 1 tablet by mouth 2 times daily. 14 tablet 0    atorvastatin (LIPITOR) 40 MG tablet Take 1 tablet (40 mg) by mouth daily. 90 tablet 1    azithromycin (ZITHROMAX) 250 MG tablet Two tablets first day, then one tablet daily for four days. 6 tablet 0    benzocaine-menthol (CHLORASEPTIC) 6-10 MG lozenge Place 1 lozenge inside cheek every hour as needed for sore throat 72 lozenge 0    benzonatate (TESSALON) 100 MG capsule Take 1-2 capsules (100-200 mg) by mouth 3 times daily as needed for cough. 30 capsule 0    blood glucose monitoring (NO BRAND SPECIFIED) meter device kit Use to test blood sugar 4 times daily or as directed. 1 kit 0    blood glucose monitoring (SOFTCLIX) lancets Use to test blood sugar 4 times daily or as directed. 200 each 3    budesonide-formoterol (SYMBICORT/BREYNA) 80-4.5 MCG/ACT inhaler Inhale 2 puffs into the lungs 2 times daily. 10.2 g 1    chlorthalidone (HYGROTON) 25 MG tablet TAKE 1 TABLET(25 MG) BY MOUTH DAILY 90 tablet 0    fluticasone-salmeterol (WIXELA INHUB) 250-50 MCG/ACT inhaler Inhale 1 puff into the lungs 2 times daily. 180 each 0    guaiFENesin (MUCINEX) 600 MG 12 hr tablet Take 600 mg by mouth 2 times daily.      insulin aspart (NOVOLOG FLEXPEN) 100 UNIT/ML pen Novolog Flexpen: 8 units 3x/day with meals 30 mL 3    insulin aspart (NOVOLOG PEN) 100  UNIT/ML pen Inject 1-10 Units Subcutaneous 3 times daily (before meals) Correction Scale - HIGH INSULIN RESISTANCE DOSING   Do Not give Correction Insulin if Pre-Meal BG less than 140. For Pre-Meal  - 164 give 1 unit. For Pre-Meal  - 189 give 2 units. For Pre-Meal  - 214 give 3 units. For Pre-Meal  - 239 give 4 units. For Pre-Meal  - 264 give 5 units. For Pre-Meal  - 289 give 6 units. For Pre-Meal  - 314 give 7 units. For Pre-Meal  - 339 give 8 units. For Pre-Meal  - 364 give 9 units.  For Pre-Meal BG greater than or equal to 365 give 10 units To be given with prandial insulin, and based on pre-meal blood glucose. Administering insulin within 5 minutes of the start of the meal is ideal. Administer insulin no more than 30 minutes after the start of the meal, unless directed otherwise by provider. Notify provider if glucose greater than or equal to 350 mg/dL after administration of correction dose. 27 mL 0    insulin glargine 100 UNIT/ML pen Inject 27 Units subcutaneously 2 times daily. 42 mL 3    insulin pen needle (31G X 8 MM) 31G X 8 MM miscellaneous Use 6 pen needles daily or as directed. 200 each 3    ipratropium - albuterol 0.5 mg/2.5 mg/3 mL (DUONEB) 0.5-2.5 (3) MG/3ML neb solution INHALE 1 VIAL BY NEBULIZATION ROUTE EVERY 6 HOURS AS NEEDED FOR SHORTNESS OF BREATH, WHEEZING OR COUGH 90 mL 3    lisinopril (ZESTRIL) 40 MG tablet TAKE 1 TABLET(40 MG) BY MOUTH DAILY 90 tablet 0    montelukast (SINGULAIR) 10 MG tablet TAKE 1 TABLET(10 MG) BY MOUTH AT BEDTIME 90 tablet 3    nicotine (NICODERM CQ) 14 MG/24HR 24 hr patch Place 1 patch onto the skin daily 30 patch 0    nystatin (MYCOSTATIN) 101945 UNIT/ML suspension Take 5 mLs (500,000 Units) by mouth 4 times daily. 473 mL 0    omeprazole-sodium bicarbonate (ZEGERID)  MG capsule Take 1 capsule (40 mg) by mouth every morning (before breakfast)      sildenafil (VIAGRA) 50 MG tablet Take 1 tablet (50 mg) by mouth  "daily as needed (30 minutes before erectile dysfunction). 30 tablet 1    SUMAtriptan (IMITREX) 25 MG tablet Take 1 tablet (25 mg) by mouth at onset of headache for migraine. May repeat in 2 hours. Max 8 tablets/24 hours. 30 tablet 1    thin (NO BRAND SPECIFIED) lancets Use with lanceting device. 200 each 6    tiZANidine (ZANAFLEX) 4 MG tablet Take 1 tablet (4 mg) by mouth nightly as needed for muscle spasms. 30 tablet 1     Allergies   Allergen Reactions    Latex Rash     Recent Labs   Lab Test 05/22/25  1145 02/20/25  1212 08/21/24  1432 02/28/24  1251 02/07/24  1225 02/01/24  0530   A1C 7.3* 7.3* 6.8* 12.1*  --   --    LDL  --  129* 117*  --   --  119*   HDL  --  34* 29*  --   --  25*   TRIG  --  326* 519*  --   --  504*   ALT  --  35  --  19 21  --    CR  --  1.02  --  0.88 0.92 0.85   GFRESTIMATED  --  >90  --  >90 >90 >90   POTASSIUM  --  4.1  --  4.0 4.1 3.7          Review of Systems  Constitutional, HEENT, cardiovascular, pulmonary, GI, , musculoskeletal, neuro, skin, endocrine and psych systems are negative, except as otherwise noted.     Objective    Exam  /80   Pulse 93   Temp 97.2  F (36.2  C) (Temporal)   Resp 20   Ht 1.836 m (6' 0.3\")   Wt 129.3 kg (285 lb)   SpO2 95%   BMI 38.33 kg/m     Estimated body mass index is 38.33 kg/m  as calculated from the following:    Height as of this encounter: 1.836 m (6' 0.3\").    Weight as of this encounter: 129.3 kg (285 lb).    Physical Exam  GENERAL: alert and no distress  EYES: Eyes grossly normal to inspection, PERRL and conjunctivae and sclerae normal  HENT: ear canals and TM's normal, nose and mouth without ulcers or lesions  NECK: no adenopathy, no asymmetry, masses, or scars  RESP: lungs clear to auscultation - no rales, rhonchi or wheezes  CV: regular rate and rhythm, normal S1 S2, no S3 or S4, no murmur, click or rub, no peripheral edema  ABDOMEN: soft, nontender, no hepatosplenomegaly, no masses and bowel sounds normal  MS: no gross " musculoskeletal defects noted, no edema  SKIN: no suspicious lesions or rashes  NEURO: Normal strength and tone, mentation intact and speech normal  PSYCH: mentation appears normal, affect normal/bright        Signed Electronically by: Deborah Escobar MD

## 2025-05-22 NOTE — PATIENT INSTRUCTIONS
As discussed, please do fasting labs placed    Refills will be taken care     Referral to spine specialist and Muscle relaxor and physical therapy    Will give Augmentin and Symbicort inhaler for your asthma / COPD and check X ray     Started on Imitrex for your headaches    Placed referral to diabetic educator    ======================    Patient Education   Preventive Care Advice   This is general advice given by our system to help you stay healthy. However, your care team may have specific advice just for you. Please talk to your care team about your preventive care needs.  Nutrition  Eat 5 or more servings of fruits and vegetables each day.  Try wheat bread, brown rice and whole grain pasta (instead of white bread, rice, and pasta).  Get enough calcium and vitamin D. Check the label on foods and aim for 100% of the RDA (recommended daily allowance).  Lifestyle  Exercise at least 150 minutes each week  (30 minutes a day, 5 days a week).  Do muscle strengthening activities 2 days a week. These help control your weight and prevent disease.  No smoking.  Wear sunscreen to prevent skin cancer.  Have a dental exam and cleaning every 6 months.  Yearly exams  See your health care team every year to talk about:  Any changes in your health.  Any medicines your care team has prescribed.  Preventive care, family planning, and ways to prevent chronic diseases.  Shots (vaccines)   HPV shots (up to age 26), if you've never had them before.  Hepatitis B shots (up to age 59), if you've never had them before.  COVID-19 shot: Get this shot when it's due.  Flu shot: Get a flu shot every year.  Tetanus shot: Get a tetanus shot every 10 years.  Pneumococcal, hepatitis A, and RSV shots: Ask your care team if you need these based on your risk.  Shingles shot (for age 50 and up)  General health tests  Diabetes screening:  Starting at age 35, Get screened for diabetes at least every 3 years.  If you are younger than age 35, ask your care  team if you should be screened for diabetes.  Cholesterol test: At age 39, start having a cholesterol test every 5 years, or more often if advised.  Bone density scan (DEXA): At age 50, ask your care team if you should have this scan for osteoporosis (brittle bones).  Hepatitis C: Get tested at least once in your life.  STIs (sexually transmitted infections)  Before age 24: Ask your care team if you should be screened for STIs.  After age 24: Get screened for STIs if you're at risk. You are at risk for STIs (including HIV) if:  You are sexually active with more than one person.  You don't use condoms every time.  You or a partner was diagnosed with a sexually transmitted infection.  If you are at risk for HIV, ask about PrEP medicine to prevent HIV.  Get tested for HIV at least once in your life, whether you are at risk for HIV or not.  Cancer screening tests  Cervical cancer screening: If you have a cervix, begin getting regular cervical cancer screening tests starting at age 21.  Breast cancer scan (mammogram): If you've ever had breasts, begin having regular mammograms starting at age 40. This is a scan to check for breast cancer.  Colon cancer screening: It is important to start screening for colon cancer at age 45.  Have a colonoscopy test every 10 years (or more often if you're at risk) Or, ask your provider about stool tests like a FIT test every year or Cologuard test every 3 years.  To learn more about your testing options, visit:   .  For help making a decision, visit:   https://bit.ly/dr80745.  Prostate cancer screening test: If you have a prostate, ask your care team if a prostate cancer screening test (PSA) at age 55 is right for you.  Lung cancer screening: If you are a current or former smoker ages 50 to 80, ask your care team if ongoing lung cancer screenings are right for you.  For informational purposes only. Not to replace the advice of your health care provider. Copyright   2023 [facility]  Services. All rights reserved. Clinically reviewed by the [facility] Transitions Program. [employer] 780971 - REV 01/24.

## 2025-05-22 NOTE — PROGRESS NOTES
{PROVIDER CHARTING PREFERENCE:811689}    Subjective   Dennis is a 45 year old, presenting for the following health issues:  Recheck Medication  {(!) Visit Details have not yet been documented.  Please enter Visit Details and then use this list to pull in documentation. (Optional):082099}  History of Present Illness       Back Pain:  He presents for follow up of back pain. Patient's back pain is a recurring problem.  Location of back pain:  Right lower back, left lower back, right side of neck, left side of neck, right buttock, left buttock, right hip, left hip, right side of waist and left side of waist  Description of back pain: cramping, dull ache, fullness and sharp  Back pain spreads: right buttocks, left buttocks, right thigh and left thigh    Since patient first noticed back pain, pain is: always present, but gets better and worse  Does back pain interfere with his job:  Not applicable       Diabetes:   He presents for follow up of diabetes.  He is checking home blood glucose two times daily.   He checks blood glucose before meals, after meals and at bedtime.  Blood glucose is sometimes over 200 and never under 70. He is aware of hypoglycemia symptoms including weakness.   He is concerned about blood sugar frequently over 200.   He is having weight gain.  The patient has not had a diabetic eye exam in the last 12 months.          Hyperlipidemia:  He presents for follow up of hyperlipidemia.   He is not taking medication to lower cholesterol. He is having myalgia or other side effects to statin medications.    Hypertension: He presents for follow up of hypertension.  He does not check blood pressure  regularly outside of the clinic. Outpatient blood pressures have not been over 140/90. He does not follow a low salt diet.     Headaches:   Since the patient's last clinic visit, headaches are: no change  The patient is getting headaches:  [id number] a week  He is able to do normal daily activities when he has a  "migraine.  The patient is taking the following rescue/relief medications:  Naproxyn (Aleve) and Tylenol   Patient states \"The relief is inconsistent\" from the rescue/relief medications.   The patient is taking the following medications to prevent migraines:  No medications to prevent migraines  In the past 4 weeks, the patient has gone to an Urgent Care or Emergency Room 0 times times due to headaches.    Reason for visit:  Med refills phy    He eats 2-3 servings of fruits and vegetables daily.He consumes 0 sweetened beverage(s) daily.He exercises with enough effort to increase his heart rate 20 to 29 minutes per day.  He exercises with enough effort to increase his heart rate 5 days per week.   He is taking medications regularly.        {MA/LPN/RN Pre-Provider Visit Orders- hCG/UA/Strep (Optional):149231}  {Reference  Diabetes Management Resources  Blood Glucose Log - 3 weeks  Blood Glucose Log with Food and Insulin Record :625582}    BP Readings from Last 2 Encounters:   02/19/25 118/86   10/18/24 115/81     Hemoglobin A1C (%)   Date Value   02/20/2025 7.3 (H)   08/21/2024 6.8 (H)     LDL Cholesterol Calculated   Date Value   02/20/2025 129 mg/dL (H)   08/21/2024      Comment:     Cannot estimate LDL when triglyceride exceeds 400 mg/dL     LDL Cholesterol Direct (mg/dL)   Date Value   08/21/2024 117 (H)     {additonal problems for provider to add (Optional):587613}    {ROS Picklists (Optional):103557}      Objective    There were no vitals taken for this visit.  There is no height or weight on file to calculate BMI.  Physical Exam   {Exam List (Optional):694344}    {Diagnostic Test Results (Optional):648300}        Signed Electronically by: Deborah Escobar MD  {Email feedback regarding this note to [email or web address]   :878891}  "

## 2025-05-25 DIAGNOSIS — I10 BENIGN ESSENTIAL HYPERTENSION: ICD-10-CM

## 2025-05-26 ENCOUNTER — PATIENT OUTREACH (OUTPATIENT)
Dept: CARE COORDINATION | Facility: CLINIC | Age: 45
End: 2025-05-26
Payer: COMMERCIAL

## 2025-05-27 RX ORDER — LISINOPRIL 40 MG/1
40 TABLET ORAL DAILY
Qty: 90 TABLET | Refills: 0 | Status: SHIPPED | OUTPATIENT
Start: 2025-05-27

## 2025-05-27 RX ORDER — CHLORTHALIDONE 25 MG/1
25 TABLET ORAL DAILY
Qty: 90 TABLET | Refills: 0 | Status: SHIPPED | OUTPATIENT
Start: 2025-05-27

## 2025-05-27 NOTE — TELEPHONE ENCOUNTER
Patient did read result note sent by Dr. Escobar:    Written by Deborah Escobar MD on 5/25/2025  5:22 PM CDT  Seen by patient Alexey Arriaga on 5/25/2025  9:18 PM  Deisy Bustos RN

## 2025-06-09 ENCOUNTER — PATIENT OUTREACH (OUTPATIENT)
Dept: CARE COORDINATION | Facility: CLINIC | Age: 45
End: 2025-06-09
Payer: COMMERCIAL

## 2025-06-11 ENCOUNTER — PATIENT OUTREACH (OUTPATIENT)
Dept: CARE COORDINATION | Facility: CLINIC | Age: 45
End: 2025-06-11
Payer: COMMERCIAL

## 2025-06-18 DIAGNOSIS — J45.41 MODERATE PERSISTENT ASTHMA WITH ACUTE EXACERBATION: ICD-10-CM

## 2025-06-18 DIAGNOSIS — Z00.00 ROUTINE GENERAL MEDICAL EXAMINATION AT A HEALTH CARE FACILITY: ICD-10-CM

## 2025-06-19 RX ORDER — ALBUTEROL SULFATE 90 UG/1
2 INHALANT RESPIRATORY (INHALATION) EVERY 4 HOURS PRN
Qty: 18 G | Refills: 1 | Status: SHIPPED | OUTPATIENT
Start: 2025-06-19

## 2025-07-09 ENCOUNTER — VIRTUAL VISIT (OUTPATIENT)
Dept: EDUCATION SERVICES | Facility: CLINIC | Age: 45
End: 2025-07-09
Attending: INTERNAL MEDICINE
Payer: COMMERCIAL

## 2025-07-09 DIAGNOSIS — Z79.4 TYPE 2 DIABETES MELLITUS WITHOUT COMPLICATION, WITH LONG-TERM CURRENT USE OF INSULIN (H): ICD-10-CM

## 2025-07-09 DIAGNOSIS — E11.9 TYPE 2 DIABETES MELLITUS WITHOUT COMPLICATION, WITH LONG-TERM CURRENT USE OF INSULIN (H): ICD-10-CM

## 2025-07-09 DIAGNOSIS — Z79.4 TYPE 2 DIABETES MELLITUS WITH OTHER SPECIFIED COMPLICATION, WITH LONG-TERM CURRENT USE OF INSULIN (H): ICD-10-CM

## 2025-07-09 DIAGNOSIS — E11.69 TYPE 2 DIABETES MELLITUS WITH OTHER SPECIFIED COMPLICATION, WITH LONG-TERM CURRENT USE OF INSULIN (H): ICD-10-CM

## 2025-07-09 PROCEDURE — G0108 DIAB MANAGE TRN  PER INDIV: HCPCS | Mod: 95 | Performed by: REGISTERED NURSE

## 2025-07-09 NOTE — LETTER
7/9/2025         RE: Alexey Arriaga  [address]  [place] [address]        Dear Colleague,    Thank you for referring your patient, Alexey Arriaga, to the [facility]. Please see a copy of my visit note below.    Diabetes Self-Management Education & Support  Type of service:  Video Visit    If the video visit is dropped, the video visit invitation should be resent by: Text to cell phone: [phone]    Originating Location (pt. Location): Other work  Distant Location (provider location): Offsite  Mode of Communication:  Video Conference via [employer]    Video Start Time: 2:00 PM  Video End Time (time video stopped): 3:00 PM    How would patient like to obtain AVS? MyChart    Assessment  Initial visit for diabetes education and counseling.  Dennis started on Mounjaro around 5/27/25 with minimal side effects.  His blood sugars are being managed with Mounjaro, Glargine and Aspart.  Patient reports his appetite is somewhat decreased.  We discussed increasing Mounjaro to 5 mg every 7 days.  His goals include losing more weight, and decreasing insulin doses.    BG LOG  Date FBG     Before  Lunch Before  Dinner Bedtime  Comments   7/9 97         7/8 114 118  135    7/7 85 105 152 187    7/6 90   138    7/5 107   114     7/4 104   170* *Forgot NovoLog before dinner   7/3 89 99  108      Plan  Check blood sugar 4 times per day (before each meal and bedtime).  Continue Mounjaro 2.5 mg every 7 days for 2 weeks until your supply is used up.  Increase Mounjaro to 5.5 mg every 7 days on Thursday, July 24.  Decrease Glargine (slow acting) to 26 units twice per day.  If you get low blood sugars overnight (less than 70), decrease Glargine to 24 units twice per day.  Continue Aspart (fast acting) 8 units before each meal plus correction as previous.  Continue to eat 3 small meals per day.  Stop eating when you feel comfortable to minimize side effects from Mounjaro.  If Mounjaro  is causing constipation try 400 mg Magnesium supplement at bedtime.  Continue to drink a lot of water to stay hydrated and protect your kidneys.  Follow-up in about 5 weeks to review blood sugars and discuss Mounjaro and insulin adjustments.    Topics to cover at upcoming visits: Monitoring, Taking Medication, Problem Solving, Reducing Risks, and Healthy Coping    See Care Plan for co-developed, patient-state behavior change goals.    Patient's most recent   Lab Results   Component Value Date    A1C 7.3 05/22/2025     is not meeting goal of <7.0    Diabetes knowledge and skills assessment:   Patient is knowledgeable in diabetes management concepts related to: Healthy Eating, Being Active, Monitoring, and Taking Medication    Based on learning assessment above, most appropriate setting for further diabetes education would be: Individual setting.    Care Plan and Education Provided:  Healthy Eating: Balanced meals, Portion control, and Weight Management  Being Active: Relationship of activity to glucose  Monitoring: Frequency of monitoring and Individual glucose targets  Taking Medication: Action of prescribed medication(s), Proper site selection and rotation for injections, and Side effects of prescribed medication(s)  Problem Solving: Low glucose - causes, signs/symptoms, treatment and prevention    Patient verbalized understanding of diabetes self-management education concepts discussed, opportunities for ongoing education and support, and recommendations provided today.    Education Materials Provided:  -- Mounjaro Medication Information    Subjective/Objective  Dennis is an 45 year old, presenting for the following diabetes education related to: Individual review  Accompanied by: Self  Diabetes education in the past 24mo: No  Focus of Visit: Taking Medication, Healthy Eating, Monitoring  Diabetes type: Type 2  Date of diagnosis: 9/30/2020 (A1c 6.7%)  Disease course: Improving  Diabetes management related  "comments/concerns: Recent Mounjaro start.  Transportation concerns: No  Difficulty affording diabetes medication?: No  Difficulty affording diabetes testing supplies?: No  Other concerns: None  Cultural Influences/Ethnic Background:  Not  or     Diabetes Symptoms & Complications:  Diabetes Related Symptoms: None  Weight trend: Stable  Symptom course: Stable  Disease course: Improving  Complications assessed today?: Yes  Peripheral neuropathy: Yes    Patient Problem List and Family Medical History reviewed for relevant medical history, current medical status, and diabetes risk factors.    Vitals:  There were no vitals taken for this visit.  Estimated body mass index is 38.33 kg/m  as calculated from the following:    Height as of 5/22/25: 1.836 m (6' 0.3\").    Weight as of 5/22/25: 129.3 kg (285 lb).     Wt Readings from Last 3 Encounters:   05/22/25 129.3 kg (285 lb)   02/19/25 131.1 kg (289 lb)   10/18/24 124.6 kg (274 lb 9.6 oz)      Last 3 BP:   BP Readings from Last 3 Encounters:   05/22/25 118/80   02/19/25 118/86   10/18/24 115/81       History   Smoking Status     Every Day     Types: Cigarettes   Smokeless Tobacco     Never       Labs:  Lab Results   Component Value Date    A1C 7.3 05/22/2025     Lab Results   Component Value Date     02/20/2025     02/01/2024     Lab Results   Component Value Date    LDL  05/22/2025      Comment:      Cannot estimate LDL when triglyceride exceeds 400 mg/dL     05/22/2025     Direct Measure HDL   Date Value Ref Range Status   05/22/2025 28 (L) >=40 mg/dL Final     GFR Estimate   Date Value Ref Range Status   02/20/2025 >90 >60 mL/min/1.73m2 Final     Comment:     eGFR calculated using 2021 CKD-EPI equation.     No results found for: \"GFRESTBLACK\"  Lab Results   Component Value Date    CR 1.02 02/20/2025     Lab Results   Component Value Date    MICROL 69.8 02/20/2025    UMALCR 30.88 (H) 02/20/2025    UCRR 226.0 02/20/2025 7/9/2025 "   Healthy Eating   Healthy Eating Assessed Today Yes   Meal planning/habits Low carb;Smaller portions   Who cooks/prepares meals for you? Self;Spouse   Who purchases food in  your home? Self;Spouse   How many times a week on average do you eat food made away from home (restaurant/take-out)? 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 Coffee with cream.   Lunch Protein shake or cheeseburger. Dislikes french fries. Lots of water   Dinner Steak, chicken wings, cheese curds or salad with cottage cheese, cranberries, walnuts or pecans. May dine out (Mexican or bar food).   Snacks summer sausage and cheese trays. Occasionally eats a candy bar (1/2 at a time) or 1/2 portion of chocolate cake and ice cream or carrot cake and ice cream.   Other Wife is a RN at [employer]-works 60 hours per week. 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   Beverages Water;Coffee;Other (see Comments)   Please elaborate: yogurt protein shake or Fairlife protein drink   Has patient met with a dietitian in the past? No         7/9/2025   Being Active   Being Active Assessed Today Yes   Exercise: Yes       walking the dog (90 lb boxer, 1 yo)   How intense was your typical exercise?  Moderate (like brisk walking)   Barrier to exercise Time         7/9/2025   Monitoring   Monitoring Assessed Today Yes   Did patient bring glucose meter to appointment?  Yes   Blood Glucose Meter Accu-chek   Times checking blood sugar at home (number) 4   Times checking blood sugar at home (per) Day   Blood glucose trend Decreasing     Diabetes Medication(s)       Insulin       insulin aspart (NOVOLOG FLEXPEN) 100 UNIT/ML pen Novolog Flexpen: 8 units 3x/day with meals     insulin aspart (NOVOLOG PEN) 100 UNIT/ML pen Inject 1-10 Units Subcutaneous 3 times daily (before meals)  Correction Scale - HIGH INSULIN RESISTANCE DOSING   Do Not give Correction Insulin if Pre-Meal BG less than 140. For Pre-Meal  - 164 give 1 unit. For Pre-Meal  - 189 give 2 units. For Pre-Meal  - 214 give 3 units. For Pre-Meal  - 239 give 4 units. For Pre-Meal  - 264 give 5 units. For Pre-Meal  - 289 give 6 units. For Pre-Meal  - 314 give 7 units. For Pre-Meal  - 339 give 8 units. For Pre-Meal  - 364 give 9 units.  For Pre-Meal BG greater than or equal to 365 give 10 units To be given with prandial insulin, and based on pre-meal blood glucose. Administering insulin within 5 minutes of the start of the meal is ideal. Administer insulin no more than 30 minutes after the start of the meal, unless directed otherwise by provider. Notify provider if glucose greater than or equal to 350 mg/dL after administration of correction dose.     insulin glargine 100 UNIT/ML pen Inject 27 Units subcutaneously 2 times daily.       Incretin Mimetic Agents       tirzepatide (MOUNJARO) 5 MG/0.5ML SOAJ auto-injector pen Inject 0.5 mLs (5 mg) subcutaneously once a week.              7/9/2025   Taking Medications   Taking Medication Assessed Today Yes   Current Treatments Diet;Insulin Injections;Non-insulin Injectables   Dose schedule Pre-breakfast;Pre-lunch;Pre-dinner;At bedtime   Given by Patient   Injection/Infusion sites Abdomen   Problems taking diabetes medications regularly? No   Diabetes medication side effects? Yes         7/9/2025   Problem Solving   Problem Solving Assessed Today Yes   Is the patient at risk for hypoglycemia? Yes   Hypoglycemia Frequency Never   Hypoglycemia Treatment Juice;Candy   Is the patient at risk for DKA? No           7/9/2025   Reducing Risks   Reducing Risks Assessed Today No   Has dilated eye exam at least once a year? Yes   Sees dentist every 6 months? No       uses a [employer]onicare toothbrush   Feet checked by healthcare provider in the last year? Yes          7/9/2025   Healthy Coping: Diabetes Distress Assessment   Healthy Coping Assessed Today No   Informal Support system: Spouse       Xena Denise RD, LD, [facility]   Certified Diabetes Care and   [facility] Tuesdays and Thursdays  [facility] Wednesdays-virtual visits only    Time Spent: 60 minutes  Encounter Type: Individual    Any diabetes medication dose changes were made via the [facility] Standing Orders under the patient's referring provider.    This note has been dictated using voice recognition software. Any grammatical or context distortions are unintentional and inherent to the software.

## 2025-07-10 NOTE — PATIENT INSTRUCTIONS
Check blood sugar 4 times per day (before each meal and bedtime).  Continue Mounjaro 2.5 mg every 7 days for 2 weeks until your supply is used up.  Increase Mounjaro to 5.5 mg every 7 days on Thursday, July 24.  Decrease Glargine (slow acting) to 26 units twice per day.  If you get low blood sugars overnight (less than 70), decrease Glargine to 24 units twice per day.  Continue Aspart (fast acting) 8 units before each meal plus correction as previous.  Continue to eat 3 small meals per day.  Stop eating when you feel comfortable to minimize side effects from Mounjaro.  If Mounjaro is causing constipation try 400 mg Magnesium supplement at bedtime.  Continue to drink a lot of water to stay hydrated and protect your kidneys.  Follow-up in about 5 weeks to review blood sugars and discuss Mounjaro and insulin adjustments.    Blood sugar goals:  Before meals   2 hours after meals: less 180  Bedtime:     A1c: less than 7.0% (estimated average blood sugar of 154 mg/dl)

## 2025-07-23 ENCOUNTER — PATIENT OUTREACH (OUTPATIENT)
Dept: CARE COORDINATION | Facility: CLINIC | Age: 45
End: 2025-07-23
Payer: COMMERCIAL

## 2025-07-23 DIAGNOSIS — J20.9 ACUTE BRONCHITIS WITH SYMPTOMS > 10 DAYS: ICD-10-CM

## 2025-07-23 DIAGNOSIS — M54.50 BILATERAL LOW BACK PAIN WITHOUT SCIATICA, UNSPECIFIED CHRONICITY: ICD-10-CM

## 2025-07-23 DIAGNOSIS — J45.41 MODERATE PERSISTENT ASTHMA WITH ACUTE EXACERBATION: ICD-10-CM

## 2025-07-24 RX ORDER — BUDESONIDE AND FORMOTEROL FUMARATE DIHYDRATE 80; 4.5 UG/1; UG/1
2 AEROSOL RESPIRATORY (INHALATION) 2 TIMES DAILY
Qty: 10.2 G | Refills: 1 | Status: SHIPPED | OUTPATIENT
Start: 2025-07-24

## 2025-08-16 DIAGNOSIS — E66.01 CLASS 2 SEVERE OBESITY WITH BODY MASS INDEX (BMI) OF 35 TO 39.9 WITH SERIOUS COMORBIDITY (H): ICD-10-CM

## 2025-08-16 DIAGNOSIS — E66.812 CLASS 2 SEVERE OBESITY WITH BODY MASS INDEX (BMI) OF 35 TO 39.9 WITH SERIOUS COMORBIDITY (H): ICD-10-CM

## 2025-08-16 DIAGNOSIS — E11.65 TYPE 2 DIABETES MELLITUS WITH HYPERGLYCEMIA, WITH LONG-TERM CURRENT USE OF INSULIN (H): ICD-10-CM

## 2025-08-16 DIAGNOSIS — Z79.4 TYPE 2 DIABETES MELLITUS WITH HYPERGLYCEMIA, WITH LONG-TERM CURRENT USE OF INSULIN (H): ICD-10-CM

## 2025-08-18 ENCOUNTER — OFFICE VISIT (OUTPATIENT)
Dept: FAMILY MEDICINE | Facility: CLINIC | Age: 45
End: 2025-08-18
Payer: COMMERCIAL

## 2025-08-18 VITALS
OXYGEN SATURATION: 94 % | BODY MASS INDEX: 36.95 KG/M2 | TEMPERATURE: 97.4 F | WEIGHT: 274.7 LBS | RESPIRATION RATE: 15 BRPM

## 2025-08-18 DIAGNOSIS — E11.69 TYPE 2 DIABETES MELLITUS WITH OTHER SPECIFIED COMPLICATION, WITH LONG-TERM CURRENT USE OF INSULIN (H): ICD-10-CM

## 2025-08-18 DIAGNOSIS — Z12.11 SCREEN FOR COLON CANCER: ICD-10-CM

## 2025-08-18 DIAGNOSIS — I10 BENIGN ESSENTIAL HYPERTENSION: ICD-10-CM

## 2025-08-18 DIAGNOSIS — T67.01XD HEAT STROKE, SUBSEQUENT ENCOUNTER: Primary | ICD-10-CM

## 2025-08-18 DIAGNOSIS — Z79.4 TYPE 2 DIABETES MELLITUS WITH OTHER SPECIFIED COMPLICATION, WITH LONG-TERM CURRENT USE OF INSULIN (H): ICD-10-CM

## 2025-08-18 LAB
EST. AVERAGE GLUCOSE BLD GHB EST-MCNC: 114 MG/DL
HBA1C MFR BLD: 5.6 % (ref 0–5.6)

## 2025-08-18 PROCEDURE — 80048 BASIC METABOLIC PNL TOTAL CA: CPT | Performed by: NURSE PRACTITIONER

## 2025-08-18 PROCEDURE — 83036 HEMOGLOBIN GLYCOSYLATED A1C: CPT | Performed by: NURSE PRACTITIONER

## 2025-08-18 PROCEDURE — 99214 OFFICE O/P EST MOD 30 MIN: CPT | Performed by: NURSE PRACTITIONER

## 2025-08-18 PROCEDURE — 36415 COLL VENOUS BLD VENIPUNCTURE: CPT | Performed by: NURSE PRACTITIONER

## 2025-08-18 PROCEDURE — 3044F HG A1C LEVEL LT 7.0%: CPT | Performed by: NURSE PRACTITIONER

## 2025-08-18 RX ORDER — INSULIN GLARGINE 100 [IU]/ML
24 INJECTION, SOLUTION SUBCUTANEOUS 2 TIMES DAILY
Qty: 42 ML | Refills: 3 | Status: SHIPPED | OUTPATIENT
Start: 2025-08-18

## 2025-08-18 RX ORDER — INSULIN ASPART 100 [IU]/ML
INJECTION, SOLUTION INTRAVENOUS; SUBCUTANEOUS
Qty: 30 ML | Refills: 3 | Status: SHIPPED | OUTPATIENT
Start: 2025-08-18 | End: 2025-08-18

## 2025-08-18 RX ORDER — TIRZEPATIDE 5 MG/.5ML
INJECTION, SOLUTION SUBCUTANEOUS
Qty: 2 ML | OUTPATIENT
Start: 2025-08-18

## 2025-08-18 RX ORDER — CHLORTHALIDONE 25 MG/1
12.5 TABLET ORAL DAILY
Qty: 45 TABLET | Refills: 0 | Status: SHIPPED | OUTPATIENT
Start: 2025-08-18

## 2025-08-18 RX ORDER — BUDESONIDE AND FORMOTEROL FUMARATE DIHYDRATE 80; 4.5 UG/1; UG/1
2 AEROSOL RESPIRATORY (INHALATION) 2 TIMES DAILY
Qty: 10.2 G | Refills: 1 | Status: SHIPPED | OUTPATIENT
Start: 2025-08-18

## 2025-08-18 ASSESSMENT — ASTHMA QUESTIONNAIRES: ACT_TOTALSCORE: 18

## 2025-08-19 ENCOUNTER — PATIENT OUTREACH (OUTPATIENT)
Dept: CARE COORDINATION | Facility: CLINIC | Age: 45
End: 2025-08-19
Payer: COMMERCIAL

## 2025-08-19 LAB
ANION GAP SERPL CALCULATED.3IONS-SCNC: 10 MMOL/L (ref 7–15)
BUN SERPL-MCNC: 20.6 MG/DL (ref 6–20)
CALCIUM SERPL-MCNC: 9.3 MG/DL (ref 8.8–10.4)
CHLORIDE SERPL-SCNC: 105 MMOL/L (ref 98–107)
CREAT SERPL-MCNC: 1.05 MG/DL (ref 0.67–1.17)
EGFRCR SERPLBLD CKD-EPI 2021: 88 ML/MIN/1.73M2
GLUCOSE SERPL-MCNC: 114 MG/DL (ref 70–99)
HCO3 SERPL-SCNC: 26 MMOL/L (ref 22–29)
POTASSIUM SERPL-SCNC: 4.2 MMOL/L (ref 3.4–5.3)
SODIUM SERPL-SCNC: 141 MMOL/L (ref 135–145)

## 2025-08-21 ENCOUNTER — PATIENT OUTREACH (OUTPATIENT)
Dept: CARE COORDINATION | Facility: CLINIC | Age: 45
End: 2025-08-21
Payer: COMMERCIAL

## 2025-09-02 DIAGNOSIS — Z00.00 ROUTINE GENERAL MEDICAL EXAMINATION AT A HEALTH CARE FACILITY: ICD-10-CM

## 2025-09-02 DIAGNOSIS — J45.41 MODERATE PERSISTENT ASTHMA WITH ACUTE EXACERBATION: ICD-10-CM

## 2025-09-04 RX ORDER — ALBUTEROL SULFATE 90 UG/1
2 INHALANT RESPIRATORY (INHALATION) EVERY 4 HOURS PRN
Qty: 18 G | Refills: 1 | Status: SHIPPED | OUTPATIENT
Start: 2025-09-04